# Patient Record
Sex: FEMALE | Race: WHITE | NOT HISPANIC OR LATINO | Employment: OTHER | URBAN - METROPOLITAN AREA
[De-identification: names, ages, dates, MRNs, and addresses within clinical notes are randomized per-mention and may not be internally consistent; named-entity substitution may affect disease eponyms.]

---

## 2017-03-15 ENCOUNTER — GENERIC CONVERSION - ENCOUNTER (OUTPATIENT)
Dept: OTHER | Facility: OTHER | Age: 67
End: 2017-03-15

## 2017-03-22 ENCOUNTER — LAB CONVERSION - ENCOUNTER (OUTPATIENT)
Dept: OTHER | Facility: OTHER | Age: 67
End: 2017-03-22

## 2017-03-22 LAB — FERRITIN SERPL-MCNC: 43 NG/ML (ref 20–288)

## 2017-05-02 ENCOUNTER — TRANSCRIBE ORDERS (OUTPATIENT)
Dept: ADMINISTRATIVE | Facility: HOSPITAL | Age: 67
End: 2017-05-02

## 2017-05-02 DIAGNOSIS — Z12.31 ENCOUNTER FOR SCREENING MAMMOGRAM FOR MALIGNANT NEOPLASM OF BREAST: Primary | ICD-10-CM

## 2017-06-07 ENCOUNTER — HOSPITAL ENCOUNTER (OUTPATIENT)
Dept: BONE DENSITY | Facility: IMAGING CENTER | Age: 67
Discharge: HOME | End: 2017-06-07
Payer: MEDICARE

## 2017-06-07 DIAGNOSIS — Z12.31 ENCOUNTER FOR SCREENING MAMMOGRAM FOR MALIGNANT NEOPLASM OF BREAST: ICD-10-CM

## 2017-06-07 PROCEDURE — G0202 SCR MAMMO BI INCL CAD: HCPCS

## 2017-08-11 ENCOUNTER — LAB CONVERSION - ENCOUNTER (OUTPATIENT)
Dept: OTHER | Facility: OTHER | Age: 67
End: 2017-08-11

## 2017-08-11 LAB
A/G RATIO (HISTORICAL): 2.1 (CALC) (ref 1–2.5)
ALBUMIN SERPL BCP-MCNC: 4.2 G/DL (ref 3.6–5.1)
ALP SERPL-CCNC: 84 U/L (ref 33–130)
ALT SERPL W P-5'-P-CCNC: 11 U/L (ref 6–29)
AST SERPL W P-5'-P-CCNC: 14 U/L (ref 10–35)
BASOPHILS # BLD AUTO: 1.2 %
BASOPHILS # BLD AUTO: 49 CELLS/UL (ref 0–200)
BILIRUB SERPL-MCNC: 0.7 MG/DL (ref 0.2–1.2)
BUN SERPL-MCNC: 14 MG/DL (ref 7–25)
BUN/CREA RATIO (HISTORICAL): 13 (CALC) (ref 6–22)
CALCIUM SERPL-MCNC: 9.4 MG/DL (ref 8.6–10.4)
CHLORIDE SERPL-SCNC: 104 MMOL/L (ref 98–110)
CO2 SERPL-SCNC: 29 MMOL/L (ref 20–31)
CREAT SERPL-MCNC: 1.04 MG/DL (ref 0.5–0.99)
DEPRECATED RDW RBC AUTO: 12.6 % (ref 11–15)
EGFR AFRICAN AMERICAN (HISTORICAL): 63 ML/MIN/1.73M2
EGFR-AMERICAN CALC (HISTORICAL): 55 ML/MIN/1.73M2
EOSINOPHIL # BLD AUTO: 234 CELLS/UL (ref 15–500)
EOSINOPHIL # BLD AUTO: 5.7 %
FERRITIN SERPL-MCNC: 57 NG/ML (ref 20–288)
GAMMA GLOBULIN (HISTORICAL): 2 G/DL (CALC) (ref 1.9–3.7)
GLUCOSE (HISTORICAL): 93 MG/DL (ref 65–99)
HCT VFR BLD AUTO: 39.5 % (ref 35–45)
HGB BLD-MCNC: 13 G/DL (ref 11.7–15.5)
LYMPHOCYTES # BLD AUTO: 1419 CELLS/UL (ref 850–3900)
LYMPHOCYTES # BLD AUTO: 34.6 %
MCH RBC QN AUTO: 31.6 PG (ref 27–33)
MCHC RBC AUTO-ENTMCNC: 32.9 G/DL (ref 32–36)
MCV RBC AUTO: 95.9 FL (ref 80–100)
MONOCYTES # BLD AUTO: 414 CELLS/UL (ref 200–950)
MONOCYTES (HISTORICAL): 10.1 %
NEUTROPHILS # BLD AUTO: 1984 CELLS/UL (ref 1500–7800)
NEUTROPHILS # BLD AUTO: 48.4 %
PLATELET # BLD AUTO: 210 THOUSAND/UL (ref 140–400)
PMV BLD AUTO: 10.3 FL (ref 7.5–12.5)
POTASSIUM SERPL-SCNC: 4.2 MMOL/L (ref 3.5–5.3)
RBC # BLD AUTO: 4.12 MILLION/UL (ref 3.8–5.1)
SODIUM SERPL-SCNC: 140 MMOL/L (ref 135–146)
TOTAL PROTEIN (HISTORICAL): 6.2 G/DL (ref 6.1–8.1)
WBC # BLD AUTO: 4.1 THOUSAND/UL (ref 3.8–10.8)

## 2017-08-17 ENCOUNTER — ALLSCRIPTS OFFICE VISIT (OUTPATIENT)
Dept: OTHER | Facility: OTHER | Age: 67
End: 2017-08-17

## 2017-09-15 DIAGNOSIS — E83.119 HEMOCHROMATOSIS: ICD-10-CM

## 2018-01-12 VITALS
TEMPERATURE: 97.9 F | HEIGHT: 67 IN | SYSTOLIC BLOOD PRESSURE: 138 MMHG | BODY MASS INDEX: 27.86 KG/M2 | WEIGHT: 177.5 LBS | OXYGEN SATURATION: 97 % | DIASTOLIC BLOOD PRESSURE: 84 MMHG | RESPIRATION RATE: 15 BRPM | HEART RATE: 53 BPM

## 2018-01-14 NOTE — PROGRESS NOTES
Assessment    1  Hemochromatosis (275 03) (E83 119)    Plan  Hemochromatosis    · Drink plenty of fluids ; Status:Complete;   Done: [id number]   Ordered;  For:Hemochromatosis; Ordered By:Gutierrez Zazueta;   · (1) CBC/PLT/DIFF; Status:Active - Retrospective By Protocol Authorization; Requested  for:[id number];    Perform:[facility]; QIZ:[id number]; Last Updated By:Leonardo Chua; 8/18/2016 8:50:07 AM;Ordered;  For:Hemochromatosis; Ordered By:Gutierrez Zazueta;   · (1) CBC/PLT/DIFF; Status:Active; Requested for:11Aug2017;    Perform:[facility]; Due:11Aug2018; Ordered;  For:Hemochromatosis; Ordered By:Gutierrez Zazueta;   · (1) COMPREHENSIVE METABOLIC PANEL; Status:Active - Retrospective By Protocol  Authorization; Requested for:18Feb2017;    Perform:[facility]; ACW:[id number]; Last Updated By:Leonardo Chua; 8/18/2016 8:49:46 AM;Ordered;  For:Hemochromatosis; Ordered By:Gutierrez Zazueta;   · (1) COMPREHENSIVE METABOLIC PANEL; Status:Active; Requested for:11Aug2017;    Perform:[facility]; Due:11Aug2018; Ordered;  For:Hemochromatosis; Ordered By:Gutierrez Zazueta;   · Follow-up visit in 1 year Evaluation and Treatment  Follow-up  Status: Complete  Done:  [id number] 08:45AM   Ordered; For: Hemochromatosis; Ordered By: Chris Lopes Performed:  Due: [id number]; Last Updated By: Scott Ponce; 8/18/2016 8:47:56 AM    Discussion/Summary  Discussion Summary:   In summary, this is a 68-year-old female history of hemochromatosis as outlined  Recent ferritin is 34  CMP and CBC are normal  I note minor elevation in creatinine, probably related to increase muscle mass  This correlates temporarily with institution of an exercise program  Within the past year  She has been donating blood to the blood bank every 6 months  It would certainly be safe for her to do this more frequently as they had requested  Overall she's doing as well as could be expected  We'll see her back in one year for review   The patient voiced understanding above discussion  Understands and agrees with treatment plan: The treatment plan was reviewed with the patient/guardian  The patient/guardian understands and agrees with the treatment plan   Counseling Documentation With Imm: The patient was counseled regarding diagnostic results, instructions for management, patient and family education, impressions  total time of encounter was 15 minutes  History of Present Illness  HPI: Hereditary hemochromatosis diagnosed in 2007  Phlebotomy prn ferritin >50  Previous Therapy:   phlebotomy q 6 mos  Interval History: returned from [facility]  Review of Systems  Complete-Female:   Constitutional: No fever, no chills, feels well, no tiredness, no recent weight gain or weight loss  Eyes: No complaints of eye pain, no red eyes, no eyesight problems, no discharge, no dry eyes, no itching of eyes  ENT: no complaints of earache, no loss of hearing, no nose bleeds, no nasal discharge, no sore throat, no hoarseness  Cardiovascular: No complaints of slow heart rate, no fast heart rate, no chest pain, no palpitations, no leg claudication, no lower extremity edema  Respiratory: No complaints of shortness of breath, no wheezing, no cough, no SOB on exertion, no orthopnea, no PND  Gastrointestinal: No complaints of abdominal pain, no constipation, no nausea or vomiting, no diarrhea, no bloody stools  Genitourinary: No complaints of dysuria, no incontinence, no pelvic pain, no dysmenorrhea, no vaginal discharge or bleeding  Musculoskeletal: No complaints of arthralgias, no myalgias, no joint swelling or stiffness, no limb pain or swelling  Integumentary: No complaints of skin rash or lesions, no itching, no skin wounds, no breast pain or lump  Neurological: No complaints of headache, no confusion, no convulsions, no numbness, no dizziness or fainting, no tingling, no limb weakness, no difficulty walking     Psychiatric: Not suicidal, no sleep disturbance, no anxiety or depression, no change in personality, no emotional problems  Endocrine: No complaints of proptosis, no hot flashes, no muscle weakness, no deepening of the voice, no feelings of weakness  Hematologic/Lymphatic: No complaints of swollen glands, no swollen glands in the neck, does not bleed easily, does not bruise easily  Active Problems    1  Hemochromatosis (275 03) (E83 119)    Social History    · Never A Smoker    Current Meds   1  Allegra Allergy TABS; TAKE 1 TABLET TWICE DAILY AS NEEDED FOR ALLERGIES   Recorded   2  Aspirin 81 MG TABS; Take 1 tablet daily Recorded   3  Calcium 500 MG TABS Recorded   4  Carvedilol 12 5 MG Oral Tablet; Take 1 tablet twice daily Recorded   5  Diovan -12 5 MG Oral Tablet; Take 1 tablet once daily Recorded   6  Vitamin D 1000 UNIT Oral Tablet; Take as directed Recorded    Allergies    1  Biaxin TABS    Vitals  Vital Signs    Recorded: 22JQX1182 92:99PM   Systolic 764   Diastolic 84   Heart Rate 64   Respiration 14   Temperature 96 8 F   Pain Scale 0   O2 Saturation 98   Height 5 ft 7 in   Weight 180 lb    BMI Calculated 28 19   BSA Calculated 1 93     Physical Exam    Constitutional   General appearance: No acute distress, well appearing and well nourished  Eyes   Conjunctiva and lids: No swelling, erythema or discharge  Ears, Nose, Mouth, and Throat   External inspection of ears and nose: Normal     Oropharynx: Normal with no erythema, edema, exudate or lesions  Pulmonary   Auscultation of lungs: Clear to auscultation  Cardiovascular   Auscultation of heart: Normal rate and rhythm, normal S1 and S2, without murmurs  Examination of extremities for edema and/or varicosities: Normal     Abdomen   Abdomen: Non-tender, no masses  Liver and spleen: No hepatomegaly or splenomegaly  Lymphatic   Palpation of lymph nodes in neck: No lymphadenopathy      Musculoskeletal   Gait and station: Normal     Skin   Skin and subcutaneous tissue: Normal without rashes or lesions  Neurologic   Cranial nerves: Cranial nerves 2-12 intact      Psychiatric   Orientation to person, place, and time: Normal          Signatures   Electronically signed by : KAYLEY Chowdhury DOM D ,DO; Aug 18 2016  8:52AM EST                       (Author)

## 2018-01-17 NOTE — MISCELLANEOUS
Message   Recorded as Task   Date: 03/15/2017 10:55 AM, Created By: Richelle Wharton   Task Name: Call Back   Assigned To: Zeinab Helton   Regarding Patient: Jessica Mackenzie, Status: Active   Comment:    Adelaide Rushing - 15 Mar 2017 10:55 AM     TASK CREATED  Caller: Self; Results Inquiry; [phone] (Home)  Pt is calling for her Ferritin results  States that she has this drawn every 6 months and she had labs drawn on 2/24/17 but Ferritin wasn't listed on her script from Aug  2016  Please call pt back to clarify  Explained to patient that Ferritin was not drawn - will be mailing patient a slip for ferritin to be done Q 6 months      Active Problems    1  Hemochromatosis (275 03) (E83 119)    Current Meds   1  Allegra Allergy TABS (Fexofenadine HCl); TAKE 1 TABLET TWICE DAILY AS NEEDED   FOR ALLERGIES Recorded   2  Aspirin 81 MG TABS; Take 1 tablet daily Recorded   3  Calcium 500 MG TABS Recorded   4  Carvedilol 12 5 MG Oral Tablet; Take 1 tablet twice daily Recorded   5  Diovan -12 5 MG Oral Tablet (Valsartan-Hydrochlorothiazide); Take 1 tablet   once daily Recorded   6  Vitamin D 1000 UNIT Oral Tablet; Take as directed Recorded    Allergies    1  Biaxin TABS    Plan  Hemochromatosis    · (1) FERRITIN; Status:Active; Requested for:15Mar2017;    · (1) FERRITIN; Status: In Progress - Retrospective By Protocol Authorization,Order  Generated;  Requested for:Recurring Schedule: 3/15/2017; 9/15/2017 ;     Signatures   Electronically signed by : Darlyn Santo, ; Mar 15 2017 11:15AM EST                       (Author)

## 2018-02-17 DIAGNOSIS — E83.119 HEMOCHROMATOSIS: ICD-10-CM

## 2018-02-21 LAB
ERYTHROCYTE [DISTWIDTH] IN BLOOD BY AUTOMATED COUNT: 12.5 % (ref 11–15)
FERRITIN SERPL-MCNC: 40 NG/ML (ref 20–288)
HCT VFR BLD AUTO: 40.1 % (ref 35–45)
HGB BLD-MCNC: 13.6 G/DL (ref 11.7–15.5)
MCH RBC QN AUTO: 32.2 PG (ref 27–33)
MCHC RBC AUTO-ENTMCNC: 33.9 G/DL (ref 32–36)
MCV RBC AUTO: 95 FL (ref 80–100)
PLATELET # BLD AUTO: 245 THOUSAND/UL (ref 140–400)
PMV BLD REES-ECKER: 10.1 FL (ref 7.5–12.5)
RBC # BLD AUTO: 4.22 MILLION/UL (ref 3.8–5.1)
WBC # BLD AUTO: 5.5 THOUSAND/UL (ref 3.8–10.8)

## 2018-04-27 ENCOUNTER — TRANSCRIBE ORDERS (OUTPATIENT)
Dept: ADMINISTRATIVE | Facility: HOSPITAL | Age: 68
End: 2018-04-27

## 2018-04-27 DIAGNOSIS — Z12.31 VISIT FOR SCREENING MAMMOGRAM: Primary | ICD-10-CM

## 2018-06-11 ENCOUNTER — HOSPITAL ENCOUNTER (OUTPATIENT)
Dept: BONE DENSITY | Facility: IMAGING CENTER | Age: 68
Discharge: HOME | End: 2018-06-11
Payer: MEDICARE

## 2018-06-11 DIAGNOSIS — Z12.31 VISIT FOR SCREENING MAMMOGRAM: ICD-10-CM

## 2018-06-11 PROCEDURE — 77067 SCR MAMMO BI INCL CAD: CPT

## 2018-08-09 ENCOUNTER — TELEPHONE (OUTPATIENT)
Dept: HEMATOLOGY ONCOLOGY | Facility: CLINIC | Age: 68
End: 2018-08-09

## 2018-08-09 DIAGNOSIS — E83.119 HEMOCHROMATOSIS, UNSPECIFIED HEMOCHROMATOSIS TYPE: Primary | ICD-10-CM

## 2018-08-09 LAB
CREAT ?TM UR-SCNC: 61 UMOL/L
MICROALBUMIN UR-MCNC: 0.3 MG/L (ref 0–20)
MICROALBUMIN/CREAT UR: 5 MG/G{CREAT}

## 2018-08-10 LAB
BASOPHILS # BLD AUTO: 27 CELLS/UL (ref 0–200)
BASOPHILS NFR BLD AUTO: 0.4 %
EOSINOPHIL # BLD AUTO: 320 CELLS/UL (ref 15–500)
EOSINOPHIL NFR BLD AUTO: 4.7 %
ERYTHROCYTE [DISTWIDTH] IN BLOOD BY AUTOMATED COUNT: 12.1 % (ref 11–15)
FERRITIN SERPL-MCNC: 62 NG/ML (ref 20–288)
HCT VFR BLD AUTO: 37.1 % (ref 35–45)
HGB BLD-MCNC: 12.5 G/DL (ref 11.7–15.5)
LYMPHOCYTES # BLD AUTO: 1421 CELLS/UL (ref 850–3900)
LYMPHOCYTES NFR BLD AUTO: 20.9 %
MCH RBC QN AUTO: 33 PG (ref 27–33)
MCHC RBC AUTO-ENTMCNC: 33.7 G/DL (ref 32–36)
MCV RBC AUTO: 97.9 FL (ref 80–100)
MONOCYTES # BLD AUTO: 564 CELLS/UL (ref 200–950)
MONOCYTES NFR BLD AUTO: 8.3 %
NEUTROPHILS # BLD AUTO: 4468 CELLS/UL (ref 1500–7800)
NEUTROPHILS NFR BLD AUTO: 65.7 %
PLATELET # BLD AUTO: 263 THOUSAND/UL (ref 140–400)
PMV BLD REES-ECKER: 10 FL (ref 7.5–12.5)
RBC # BLD AUTO: 3.79 MILLION/UL (ref 3.8–5.1)
WBC # BLD AUTO: 6.8 THOUSAND/UL (ref 3.8–10.8)

## 2018-08-16 ENCOUNTER — OFFICE VISIT (OUTPATIENT)
Dept: HEMATOLOGY ONCOLOGY | Facility: CLINIC | Age: 68
End: 2018-08-16
Payer: MEDICARE

## 2018-08-16 VITALS
OXYGEN SATURATION: 98 % | HEART RATE: 48 BPM | DIASTOLIC BLOOD PRESSURE: 76 MMHG | SYSTOLIC BLOOD PRESSURE: 136 MMHG | RESPIRATION RATE: 17 BRPM | HEIGHT: 67 IN | TEMPERATURE: 97.9 F | BODY MASS INDEX: 27.81 KG/M2 | WEIGHT: 177.2 LBS

## 2018-08-16 DIAGNOSIS — E83.110 HEREDITARY HEMOCHROMATOSIS (HCC): Primary | ICD-10-CM

## 2018-08-16 PROCEDURE — 99213 OFFICE O/P EST LOW 20 MIN: CPT | Performed by: INTERNAL MEDICINE

## 2018-08-16 RX ORDER — KETOTIFEN FUMARATE 0.35 MG/ML
1 SOLUTION/ DROPS OPHTHALMIC AS NEEDED
COMMUNITY

## 2018-08-16 NOTE — PROGRESS NOTES
[facility] HEMATOLOGY ONCOLOGY Junious Manifold  [address]  [address] [address] [phone]    Lizzette Arrieta DHBQOS[birth year], [id number]  08/16/18    Discussion:   In summary, this is a 25-year-old female history of hemochromatosis  She has phlebotomy 2 or 3 times a year  Most recent ferritin was 62, essentially in her previously established range, 30-50  CBC and differential were normal   Continue phlebotomy is requested  She is up-to-date with colonoscopy and mammography  She has not had a GYN exam in many years  She had previously seen a GYN and was told that neuro further follow-up was requested  She had a hysterectomy more than 2 decades ago  Gyn exam was suggested at this time  Provider name and information were provided  She also had borderline elevation of creatinine last year  She was concerned about her kidney function  She will be contacting her PCP regarding more recent re-evaluation  I discussed the above with the patient  The patient  voiced understanding and agreement   ______________________________________________________________________    Chief Complaint   Patient presents with    Follow-up     Hemochromatosis       HPI:   No history exists  Interval History:  Clinically stable  0 - Asymptomatic    Review of Systems    Past Medical History:   Diagnosis Date    Cardiac disease     right bundle branch block    Hemochromatosis      There is no problem list on file for this patient        Current Outpatient Prescriptions:     aspirin 81 MG tablet, Take 81 mg by mouth daily, Disp: , Rfl:     CALCIUM PO, Take 1,000 mg by mouth daily, Disp: , Rfl:     carvedilol (COREG) 12 5 mg tablet, Take 12 5 mg by mouth 2 (two) times a day with meals, Disp: , Rfl:     Cholecalciferol (VITAMIN D PO), Take 2,000 Units by mouth daily  , Disp: , Rfl:     fexofenadine (ALLEGRA) 60 MG tablet, Take 60 mg by mouth every 12 (twelve) hours as needed, Disp: , Rfl:    ketotifen (ALAWAY) 0 025 % ophthalmic solution, 1 drop as needed, Disp: , Rfl:     valsartan-hydrochlorothiazide (DIOVAN-HCT) 320-12 5 MG per tablet, Take 1 tablet by mouth daily, Disp: , Rfl:   Allergies   Allergen Reactions    Biaxin [Clarithromycin]     Claritin [Loratadine]     Macrodantin [Nitrofurantoin Macrocrystal]      Past Surgical History:   Procedure Laterality Date    APPENDECTOMY      HYSTERECTOMY       Social History     Objective:  Vitals:    08/16/18 0840   BP: 136/76   BP Location: Left arm   Patient Position: Sitting   Pulse: (!) 48   Resp: 17   Temp: 97 9 °F (36 6 °C)   TempSrc: Tympanic   SpO2: 98%   Weight: 80 4 kg (177 lb 3 2 oz)   Height: 5' 7" (1 702 m)     Physical Exam      Labs: I personally reviewed the labs and imaging pertinent to this patient care

## 2018-08-16 NOTE — LETTER
August 16, 2018     Jeannie Mathias DO  [facility]  [address] [address]    Patient: Brendan Alva   YOB: 1950   Date of Visit: 8/16/2018       Dear Dr Derrell Frazier:    Thank you for referring Brendan Alva to me for evaluation  Below are my notes for this consultation  If you have questions, please do not hesitate to call me  I look forward to following your patient along with you  Sincerely,        Juana Henderson DO        CC: No Recipients  Juana Henderson DO  8/16/2018  9:31 AM  Sign at close encounter  [address]  [address]  [facility] [address] [facility] [address]  [phone]  [phone]    Kasie Rivera,1950, [id number]  08/16/18    Discussion:   In summary, this is a 64-year-old female history of hemochromatosis  She has phlebotomy 2 or 3 times a year  Most recent ferritin was 62, essentially in her previously established range, 30-50  CBC and differential were normal   Continue phlebotomy is requested  She is up-to-date with colonoscopy and mammography  She has not had a GYN exam in many years  She had previously seen a GYN and was told that neuro further follow-up was requested  She had a hysterectomy more than 2 decades ago  Gyn exam was suggested at this time  Provider name and information were provided  She also had borderline elevation of creatinine last year  She was concerned about her kidney function  She will be contacting her PCP regarding more recent re-evaluation  I discussed the above with the patient  The patient  voiced understanding and agreement   ______________________________________________________________________    Chief Complaint   Patient presents with    Follow-up     Hemochromatosis       HPI:   No history exists  Interval History:  Clinically stable    0 - Asymptomatic    Review of Systems    Past Medical History:   Diagnosis Date    Cardiac disease     right bundle branch block    Hemochromatosis      There is no problem list on file for this patient  Current Outpatient Prescriptions:     aspirin 81 MG tablet, Take 81 mg by mouth daily, Disp: , Rfl:     CALCIUM PO, Take 1,000 mg by mouth daily, Disp: , Rfl:     carvedilol (COREG) 12 5 mg tablet, Take 12 5 mg by mouth 2 (two) times a day with meals, Disp: , Rfl:     Cholecalciferol (VITAMIN D PO), Take 2,000 Units by mouth daily  , Disp: , Rfl:     fexofenadine (ALLEGRA) 60 MG tablet, Take 60 mg by mouth every 12 (twelve) hours as needed, Disp: , Rfl:     ketotifen (ALAWAY) 0 025 % ophthalmic solution, 1 drop as needed, Disp: , Rfl:     valsartan-hydrochlorothiazide (DIOVAN-HCT) 320-12 5 MG per tablet, Take 1 tablet by mouth daily, Disp: , Rfl:   Allergies   Allergen Reactions    Biaxin [Clarithromycin]     Claritin [Loratadine]     Macrodantin [Nitrofurantoin Macrocrystal]      Past Surgical History:   Procedure Laterality Date    APPENDECTOMY      HYSTERECTOMY       Social History     Objective:  Vitals:    08/16/18 0840   BP: 136/76   BP Location: Left arm   Patient Position: Sitting   Pulse: (!) 48   Resp: 17   Temp: 97 9 °F (36 6 °C)   TempSrc: Tympanic   SpO2: 98%   Weight: 80 4 kg (177 lb 3 2 oz)   Height: 5' 7" (1 702 m)     Physical Exam      Labs: I personally reviewed the labs and imaging pertinent to this patient care

## 2018-08-17 DIAGNOSIS — E83.119 HEMOCHROMATOSIS: ICD-10-CM

## 2018-10-08 ENCOUNTER — OFFICE VISIT (OUTPATIENT)
Dept: OBGYN CLINIC | Facility: MEDICAL CENTER | Age: 68
End: 2018-10-08
Payer: MEDICARE

## 2018-10-08 VITALS
WEIGHT: 178 LBS | HEIGHT: 66 IN | DIASTOLIC BLOOD PRESSURE: 70 MMHG | SYSTOLIC BLOOD PRESSURE: 124 MMHG | BODY MASS INDEX: 28.61 KG/M2

## 2018-10-08 DIAGNOSIS — Z01.419 ENCOUNTER FOR GYNECOLOGICAL EXAMINATION WITHOUT ABNORMAL FINDING: Primary | ICD-10-CM

## 2018-10-08 DIAGNOSIS — Z12.31 ENCOUNTER FOR SCREENING MAMMOGRAM FOR MALIGNANT NEOPLASM OF BREAST: ICD-10-CM

## 2018-10-08 PROCEDURE — G0101 CA SCREEN;PELVIC/BREAST EXAM: HCPCS | Performed by: OBSTETRICS & GYNECOLOGY

## 2018-10-08 RX ORDER — GLUCOSAMINE/D3/BOSWELLIA SERRA 1500MG-400
TABLET ORAL
COMMUNITY

## 2018-10-08 NOTE — PROGRESS NOTES
Assessment/Plan:    Pap smear not indicated, age/hysterectomy status  Discussed okay for gynecologic exam 2 years  Continue JOCELYNN measures  Call with concerns  Encounter for screening mammogram for malignant neoplasm of breast  -     Mammo screening bilateral w cad; Future            Subjective:      Patient ID: Philip Gardiner is a 77 y o  female  Yearly  Menarche-11  Grav-5 Para-2   x 2, Spont AB x 3  Hysterectomy   Pap- unknown  Mammogram 2018-negative  Dexa scan 2015- osteopenia  Colonoscopy-, next due     Arnoldo Benavides is doing well  Has not seen gynecologist in some time, was not clear that she had to come back after her hysterectomy  She underwent a vaginal hysterectomy in  for fibroids and abnormal bleeding  She still has her ovaries  She has had no bleeding, vaginal discharge, pelvic pain  She does have some JOCELYNN, she uses pose impreza and also a daily thin pad  She is well versed in this as 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 Gynecologic Exam         The following portions of the patient's history were reviewed and updated as appropriate: allergies, current medications, past family history, past medical history, past social history, past surgical history and problem list     Review of Systems      Objective:      /70 (BP Location: Left arm, Patient Position: Sitting, Cuff Size: Large)   Ht 5' 5 5" (1 664 m)   Wt 80 7 kg (178 lb)   BMI 29 17 kg/m²          Physical Exam   Constitutional: She is oriented to person, place, and time  She appears well-developed and well-nourished  No distress  Pulmonary/Chest: No respiratory distress  Right breast exhibits no inverted nipple, no mass, no nipple discharge, no skin change and no tenderness   Left breast exhibits no inverted nipple, no mass, no nipple discharge, no skin change and no tenderness  Abdominal: Soft  There is no tenderness  Genitourinary: Vagina normal  There is no rash or lesion on the right labia  There is no rash or lesion on the left labia  Right adnexum displays no mass and no tenderness  Left adnexum displays no mass and no tenderness  Genitourinary Comments: Cervix: surgically absent  Uterus: surgically absent     Neurological: She is alert and oriented to person, place, and time  Skin: Skin is warm and dry  Psychiatric: She has a normal mood and affect  Vitals reviewed

## 2019-02-23 LAB
BASOPHILS # BLD AUTO: 52 CELLS/UL (ref 0–200)
BASOPHILS NFR BLD AUTO: 1.2 %
EOSINOPHIL # BLD AUTO: 327 CELLS/UL (ref 15–500)
EOSINOPHIL NFR BLD AUTO: 7.6 %
ERYTHROCYTE [DISTWIDTH] IN BLOOD BY AUTOMATED COUNT: 11.8 % (ref 11–15)
HCT VFR BLD AUTO: 37.5 % (ref 35–45)
HGB BLD-MCNC: 12.6 G/DL (ref 11.7–15.5)
IRON SATN MFR SERPL: 44 % (CALC) (ref 11–50)
IRON SERPL-MCNC: 130 MCG/DL (ref 45–160)
LYMPHOCYTES # BLD AUTO: 1350 CELLS/UL (ref 850–3900)
LYMPHOCYTES NFR BLD AUTO: 31.4 %
MCH RBC QN AUTO: 32.4 PG (ref 27–33)
MCHC RBC AUTO-ENTMCNC: 33.6 G/DL (ref 32–36)
MCV RBC AUTO: 96.4 FL (ref 80–100)
MONOCYTES # BLD AUTO: 426 CELLS/UL (ref 200–950)
MONOCYTES NFR BLD AUTO: 9.9 %
NEUTROPHILS # BLD AUTO: 2146 CELLS/UL (ref 1500–7800)
NEUTROPHILS NFR BLD AUTO: 49.9 %
PLATELET # BLD AUTO: 240 THOUSAND/UL (ref 140–400)
PMV BLD REES-ECKER: 10.3 FL (ref 7.5–12.5)
RBC # BLD AUTO: 3.89 MILLION/UL (ref 3.8–5.1)
TIBC SERPL-MCNC: 294 MCG/DL (CALC) (ref 250–450)
WBC # BLD AUTO: 4.3 THOUSAND/UL (ref 3.8–10.8)

## 2019-06-18 ENCOUNTER — HOSPITAL ENCOUNTER (OUTPATIENT)
Dept: BONE DENSITY | Facility: IMAGING CENTER | Age: 69
Discharge: HOME | End: 2019-06-18
Payer: MEDICARE

## 2019-06-18 VITALS — HEIGHT: 67 IN | WEIGHT: 169 LBS | BODY MASS INDEX: 26.53 KG/M2

## 2019-06-18 DIAGNOSIS — Z12.31 ENCOUNTER FOR SCREENING MAMMOGRAM FOR MALIGNANT NEOPLASM OF BREAST: ICD-10-CM

## 2019-06-18 PROCEDURE — 77067 SCR MAMMO BI INCL CAD: CPT

## 2019-08-08 ENCOUNTER — TELEPHONE (OUTPATIENT)
Dept: HEMATOLOGY ONCOLOGY | Facility: CLINIC | Age: 69
End: 2019-08-08

## 2019-08-08 NOTE — TELEPHONE ENCOUNTER
Left message on cell phone and home voicemail  to call the office back to reschedule her appt with Dr Lyudmila Reed on 8/19

## 2019-08-09 ENCOUNTER — TELEPHONE (OUTPATIENT)
Dept: HEMATOLOGY ONCOLOGY | Facility: CLINIC | Age: 69
End: 2019-08-09

## 2019-08-09 NOTE — TELEPHONE ENCOUNTER
Left message to call office to reschedule appt with DR Zazueta  He will be out of the office that day

## 2019-08-12 ENCOUNTER — TELEPHONE (OUTPATIENT)
Dept: HEMATOLOGY ONCOLOGY | Facility: CLINIC | Age: 69
End: 2019-08-12

## 2019-08-12 DIAGNOSIS — E83.110 HEREDITARY HEMOCHROMATOSIS (HCC): Primary | ICD-10-CM

## 2019-08-12 NOTE — TELEPHONE ENCOUNTER
Patient called to schedule her 1 yr f/u appointment to see Dr Last Enter  Patient is scheduled for 8/22  Also she needs a new script for blood work  She would like to have it faxed over to [address] in [facility]  Ph: ; Fx: [phone]  Please call back and advise  Thank you

## 2019-08-21 LAB
BASOPHILS # BLD AUTO: 38 CELLS/UL (ref 0–200)
BASOPHILS NFR BLD AUTO: 0.8 %
EOSINOPHIL # BLD AUTO: 240 CELLS/UL (ref 15–500)
EOSINOPHIL NFR BLD AUTO: 5.1 %
ERYTHROCYTE [DISTWIDTH] IN BLOOD BY AUTOMATED COUNT: 12.5 % (ref 11–15)
HCT VFR BLD AUTO: 36.5 % (ref 35–45)
HGB BLD-MCNC: 12.2 G/DL (ref 11.7–15.5)
IRON SATN MFR SERPL: 35 % (CALC) (ref 16–45)
IRON SERPL-MCNC: 102 MCG/DL (ref 45–160)
LYMPHOCYTES # BLD AUTO: 1452 CELLS/UL (ref 850–3900)
LYMPHOCYTES NFR BLD AUTO: 30.9 %
MCH RBC QN AUTO: 33 PG (ref 27–33)
MCHC RBC AUTO-ENTMCNC: 33.4 G/DL (ref 32–36)
MCV RBC AUTO: 98.6 FL (ref 80–100)
MONOCYTES # BLD AUTO: 456 CELLS/UL (ref 200–950)
MONOCYTES NFR BLD AUTO: 9.7 %
NEUTROPHILS # BLD AUTO: 2515 CELLS/UL (ref 1500–7800)
NEUTROPHILS NFR BLD AUTO: 53.5 %
PLATELET # BLD AUTO: 204 THOUSAND/UL (ref 140–400)
PMV BLD REES-ECKER: 10.2 FL (ref 7.5–12.5)
RBC # BLD AUTO: 3.7 MILLION/UL (ref 3.8–5.1)
TIBC SERPL-MCNC: 292 MCG/DL (CALC) (ref 250–450)
WBC # BLD AUTO: 4.7 THOUSAND/UL (ref 3.8–10.8)

## 2019-08-22 ENCOUNTER — OFFICE VISIT (OUTPATIENT)
Dept: HEMATOLOGY ONCOLOGY | Facility: CLINIC | Age: 69
End: 2019-08-22
Payer: MEDICARE

## 2019-08-22 DIAGNOSIS — E83.110 HEREDITARY HEMOCHROMATOSIS (HCC): Primary | ICD-10-CM

## 2019-08-22 PROCEDURE — 99213 OFFICE O/P EST LOW 20 MIN: CPT | Performed by: INTERNAL MEDICINE

## 2019-08-22 NOTE — PROGRESS NOTES
[facility] HEMATOLOGY ONCOL[facility]Y [facility] [place]  [address] [facility] [address]  [phone]  [phone]    Destiny Monday Nicole,1950, [id number]  19    Discussion:   In summary, this is a 64-year-old female history of hemochromatosis  She continues with phlebotomy on a Q 4-6 month basis at the local blood bank  Most recent iron saturation was 35%  CBC normal   Clinically she functions without restriction  Continue treatment as outlined is recommended  I will see her back in 1 year for review  I discussed the above with the patient  The patient  voiced understanding and agreement   ______________________________________________________________________    Chief Complaint   Patient presents with    Follow-up       HPI:   No history exists  Interval History:  Clinically stable  ECOG-  0 - Asymptomatic    Review of Systems   Constitutional: Negative for appetite change, diaphoresis, fatigue and fever  HENT: Negative for sinus pain  Eyes: Negative for discharge  Respiratory: Negative for cough and shortness of breath  Cardiovascular: Negative for chest pain  Gastrointestinal: Negative for abdominal pain, constipation and diarrhea  Endocrine: Negative for cold intolerance  Genitourinary: Negative for difficulty urinating and hematuria  Musculoskeletal: Negative for joint swelling  Skin: Negative for rash  Allergic/Immunologic: Negative for environmental allergies  Neurological: Negative for dizziness and headaches  Hematological: Negative for adenopathy  Psychiatric/Behavioral: Negative for agitation         Past Medical History:   Diagnosis Date    Cardiac disease     right bundle branch block    Fibroids     Hemochromatosis     Right bundle branch block      (spontaneous vaginal delivery)     x2     Patient Active Problem List   Diagnosis    Hereditary hemochromatosis ([facility] [address] )       Current Outpatient Medications:     aspirin 81 MG tablet, Take 81 mg by mouth daily, Disp: , Rfl:     B Complex Vitamins (B COMPLEX PO), Take by mouth daily, Disp: , Rfl:     Biotin 60423 MCG TABS, Take by mouth, Disp: , Rfl:     CALCIUM PO, Take 1,000 mg by mouth daily, Disp: , Rfl:     carvedilol (COREG) 12 5 mg tablet, Take 12 5 mg by mouth 2 (two) times a day with meals, Disp: , Rfl:     Cholecalciferol (VITAMIN D PO), Take 2,000 Units by mouth daily  , Disp: , Rfl:     fexofenadine (ALLEGRA) 60 MG tablet, Take 60 mg by mouth every 12 (twelve) hours as needed, Disp: , Rfl:     ketotifen (ALAWAY) 0 025 % ophthalmic solution, 1 drop as needed, Disp: , Rfl:     valsartan-hydrochlorothiazide (DIOVAN-HCT) 320-12 5 MG per tablet, Take 1 tablet by mouth daily, Disp: , Rfl:   Allergies   Allergen Reactions    Biaxin [Clarithromycin]     Claritin [Loratadine]     Macrodantin [Nitrofurantoin Macrocrystal]      Past Surgical History:   Procedure Laterality Date    APPENDECTOMY      DILATION AND CURETTAGE OF UTERUS      HYSTERECTOMY      age 26 per [facility]     Social History     Objective: There were no vitals filed for this visit  Physical Exam   Constitutional: She is oriented to person, place, and time  She appears well-developed  HENT:   Head: Normocephalic  Eyes: Pupils are equal, round, and reactive to light  Neck: Neck supple  Cardiovascular: Normal rate  No murmur heard  Pulmonary/Chest: No respiratory distress  She has no wheezes  She has no rales  Abdominal: Soft  She exhibits no distension  There is no tenderness  There is no rebound  Musculoskeletal: She exhibits no edema  Lymphadenopathy:     She has no cervical adenopathy  Neurological: She is alert and oriented to person, place, and time  She displays normal reflexes  Skin: Skin is warm  No rash noted  Psychiatric: She has a normal mood and affect  Thought content normal          Labs: I personally reviewed the labs and imaging pertinent to this patient care

## 2020-02-17 LAB
IRON SATN MFR SERPL: 47 % (CALC) (ref 16–45)
IRON SERPL-MCNC: 144 MCG/DL (ref 45–160)
TIBC SERPL-MCNC: 308 MCG/DL (CALC) (ref 250–450)

## 2020-06-01 ENCOUNTER — TRANSCRIBE ORDERS (OUTPATIENT)
Dept: ADMINISTRATIVE | Facility: HOSPITAL | Age: 70
End: 2020-06-01

## 2020-06-01 DIAGNOSIS — M25.511 ACUTE PAIN OF RIGHT SHOULDER: Primary | ICD-10-CM

## 2020-06-01 DIAGNOSIS — Z12.31 SCREENING MAMMOGRAM FOR HIGH-RISK PATIENT: ICD-10-CM

## 2020-06-15 ENCOUNTER — APPOINTMENT (OUTPATIENT)
Dept: RADIOLOGY | Age: 70
End: 2020-06-15
Payer: MEDICARE

## 2020-06-15 DIAGNOSIS — M25.511 ACUTE PAIN OF RIGHT SHOULDER: ICD-10-CM

## 2020-06-15 PROCEDURE — 73030 X-RAY EXAM OF SHOULDER: CPT

## 2020-06-19 ENCOUNTER — HOSPITAL ENCOUNTER (OUTPATIENT)
Dept: BONE DENSITY | Facility: IMAGING CENTER | Age: 70
Discharge: HOME | End: 2020-06-19
Payer: MEDICARE

## 2020-06-19 VITALS — HEIGHT: 66 IN | BODY MASS INDEX: 27.16 KG/M2 | WEIGHT: 169 LBS

## 2020-06-19 DIAGNOSIS — Z12.31 SCREENING MAMMOGRAM FOR HIGH-RISK PATIENT: ICD-10-CM

## 2020-06-19 PROCEDURE — 77063 BREAST TOMOSYNTHESIS BI: CPT

## 2020-06-19 PROCEDURE — 77067 SCR MAMMO BI INCL CAD: CPT

## 2020-06-25 ENCOUNTER — EVALUATION (OUTPATIENT)
Dept: PHYSICAL THERAPY | Facility: REHABILITATION | Age: 70
End: 2020-06-25
Payer: MEDICARE

## 2020-06-25 ENCOUNTER — TRANSCRIBE ORDERS (OUTPATIENT)
Dept: PHYSICAL THERAPY | Facility: REHABILITATION | Age: 70
End: 2020-06-25

## 2020-06-25 VITALS — HEART RATE: 55 BPM | OXYGEN SATURATION: 99 %

## 2020-06-25 DIAGNOSIS — M25.511 ACUTE PAIN OF RIGHT SHOULDER: Primary | ICD-10-CM

## 2020-06-25 PROCEDURE — 97140 MANUAL THERAPY 1/> REGIONS: CPT | Performed by: PHYSICAL THERAPIST

## 2020-06-25 PROCEDURE — 97161 PT EVAL LOW COMPLEX 20 MIN: CPT | Performed by: PHYSICAL THERAPIST

## 2020-06-30 ENCOUNTER — OFFICE VISIT (OUTPATIENT)
Dept: PHYSICAL THERAPY | Facility: REHABILITATION | Age: 70
End: 2020-06-30
Payer: MEDICARE

## 2020-06-30 DIAGNOSIS — M25.511 ACUTE PAIN OF RIGHT SHOULDER: Primary | ICD-10-CM

## 2020-06-30 PROCEDURE — 97110 THERAPEUTIC EXERCISES: CPT | Performed by: PHYSICAL THERAPIST

## 2020-06-30 PROCEDURE — 97140 MANUAL THERAPY 1/> REGIONS: CPT | Performed by: PHYSICAL THERAPIST

## 2020-07-02 ENCOUNTER — OFFICE VISIT (OUTPATIENT)
Dept: PHYSICAL THERAPY | Facility: REHABILITATION | Age: 70
End: 2020-07-02
Payer: MEDICARE

## 2020-07-02 DIAGNOSIS — M25.511 ACUTE PAIN OF RIGHT SHOULDER: Primary | ICD-10-CM

## 2020-07-02 PROCEDURE — 97112 NEUROMUSCULAR REEDUCATION: CPT | Performed by: PHYSICAL THERAPIST

## 2020-07-02 PROCEDURE — 97140 MANUAL THERAPY 1/> REGIONS: CPT | Performed by: PHYSICAL THERAPIST

## 2020-07-02 PROCEDURE — 97110 THERAPEUTIC EXERCISES: CPT | Performed by: PHYSICAL THERAPIST

## 2020-07-02 NOTE — PROGRESS NOTES
Daily Note     Today's date: 2020  Patient name: Shivani Souza  : 1950  MRN: [id number]  Referring provider: Dao Rodriguez DO  Dx:   Encounter Diagnosis     ICD-10-CM    1  Acute pain of right shoulder M25 511        Start Time: 1530  Stop Time: 1615  Total time in clinic (min): 45 minutes    Subjective:  Pt feels her shoulder ROM is improving  Objective: See treatment diary below  HEP updated with scap recruitment  Discussed TE options in the swimming pool  Within quality of range improving with shoulder abduction and flexion  Shoulder er and ir prom = wnl        Assessment: Tolerated treatment well  Patient would benefit from continued PT      Plan: Continue per plan of care        Precautions: hemochromatosis      Manuals           Supine R shoulder PROM - gentle LMR LMR LMR          GH joint mobs - inf Gr 2 - LMR Gr 2 - LMR Gr 2 - LMR          GH joint mobs - post Gr 2 - LMR Gr 2 & 4 - LMR Gr 2 - LMR          S/l shoulder PROM   LMR          Neuro Re-Ed                                                                                                      Ther Ex           Standing - flexion - counter slides x2 hep           HEP LMR LMR LMR          ube  4' 5'          OH band stretch  Thick yellow - 2'           Banded lat stretch  Thick yellow - 2'           Seated scap recruitment   15          S/l shoulder flexion (to 90)   15, 9                        Ther Activity                                       Gait Training                                       Modalities

## 2020-07-07 ENCOUNTER — OFFICE VISIT (OUTPATIENT)
Dept: PHYSICAL THERAPY | Facility: REHABILITATION | Age: 70
End: 2020-07-07
Payer: MEDICARE

## 2020-07-07 DIAGNOSIS — M25.511 ACUTE PAIN OF RIGHT SHOULDER: Primary | ICD-10-CM

## 2020-07-07 PROCEDURE — 97140 MANUAL THERAPY 1/> REGIONS: CPT | Performed by: PHYSICAL THERAPIST

## 2020-07-07 PROCEDURE — 97110 THERAPEUTIC EXERCISES: CPT | Performed by: PHYSICAL THERAPIST

## 2020-07-07 NOTE — PROGRESS NOTES
Daily Note     Today's date: 2020  Patient name: Marianela Guadalupe  : 1950  MRN: [id number]  Referring provider: Diana Mcdonough DO  Dx:   Encounter Diagnosis     ICD-10-CM    1  Acute pain of right shoulder M25 511        Start Time: 730  Stop Time: 825  Total time in clinic (min): 55 minutes    Subjective:  Pt was sore for 1-2 days after the last session  She continues to c/o limitations with OH reaching due to pain  Objective: See treatment diary below  HEP updated with p-ball rolls on wall and prone scap retraction + shoulder extension  Pt guards at end-range flexion and abduction  Abduction PROM ~ 90 deg  Assessment: Tolerated treatment well  Patient would benefit from continued PT    Plan: Continue per plan of care  Focus on pain-free gentle PROM  Resume s/l shoulder flexion arom next visit and progress scap mechanics        Precautions: hemochromatosis      Manuals          Supine R shoulder PROM - gentle LMR LMR LMR LMR         GH joint mobs - inf Gr 2 - LMR Gr 2 - LMR Gr 2 - LMR Gr 2 - LMR         GH joint mobs - post Gr 2 - LMR Gr 2 & 4 - LMR Gr 2 - LMR Gr 2 & 4 - LMR         S/l shoulder PROM   LMR          Neuro Re-Ed                                                                                                     Ther Ex          Standing - flexion - counter slides x2 hep           HEP LMR LMR LMR LMR         ube  4' 5' 5'         OH band stretch  Thick yellow - 2'           Banded lat stretch  Thick yellow - 2'           Seated scap recruitment   15          S/l shoulder flexion (to 90)   15, 9           Prone scap retractions    3"x13         Prone scap retraction + shoulder extension    3x5         p-ball OH rolls    x7         p-ball bounce    3'                                   Ther Activity                                       Gait Training                                       Modalities

## 2020-07-13 ENCOUNTER — OFFICE VISIT (OUTPATIENT)
Dept: PHYSICAL THERAPY | Facility: REHABILITATION | Age: 70
End: 2020-07-13
Payer: MEDICARE

## 2020-07-13 DIAGNOSIS — M25.511 ACUTE PAIN OF RIGHT SHOULDER: Primary | ICD-10-CM

## 2020-07-13 PROCEDURE — 97112 NEUROMUSCULAR REEDUCATION: CPT | Performed by: PHYSICAL MEDICINE & REHABILITATION

## 2020-07-13 PROCEDURE — 97140 MANUAL THERAPY 1/> REGIONS: CPT | Performed by: PHYSICAL MEDICINE & REHABILITATION

## 2020-07-13 PROCEDURE — 97110 THERAPEUTIC EXERCISES: CPT | Performed by: PHYSICAL MEDICINE & REHABILITATION

## 2020-07-13 NOTE — PROGRESS NOTES
Daily Note     Today's date: 2020  Patient name: Taran Escobedo  : 1950  MRN: [id number]  Referring provider: Pedro Restrepo DO  Dx:   Encounter Diagnosis     ICD-10-CM    1  Acute pain of right shoulder M25 511                   Subjective:  Patient reports significant discomfort with performance of prone shoulder extension as part of HEP, left this exercise out over the weekend and had no further issues  Objective: See treatment diary below    Assessment: Tolerated treatment well  Able to resume s/l flexion without issue, minimal sxs today with prone extension after slight form adjustment  Patient would benefit from continued PT  Plan: Continue per plan of care  Focus on pain-free gentle PROM       Precautions: hemochromatosis    Manuals         Supine R shoulder PROM - gentle LMR LMR LMR LMR LH        GH joint mobs - inf Gr 2 - LMR Gr 2 - LMR Gr 2 - LMR Gr 2 - LMR LH        GH joint mobs - post Gr 2 - LMR Gr 2 & 4 - LMR Gr 2 - LMR Gr 2 & 4 - LMR LH        S/l shoulder PROM   LMR          Neuro Re-Ed                                                                                                    Ther Ex         Standing - flexion - counter slides x2 hep           HEP LMR LMR LMR LMR         ube  4' 5' 5' 3'/3'        OH band stretch  Thick yellow - 2'           Banded lat stretch  Thick yellow - 2'           Seated scap recruitment   15          S/l shoulder flexion (to 90)   15, 9   3x5        Prone scap retractions    3"x13 2x10x3"        Prone scap retraction + shoulder extension    3x5 3x5        p-ball OH rolls    x7 x10        p-ball bounce    3' 3'                                  Ther Activity                                       Gait Training                                       Modalities

## 2020-07-16 ENCOUNTER — OFFICE VISIT (OUTPATIENT)
Dept: PHYSICAL THERAPY | Facility: REHABILITATION | Age: 70
End: 2020-07-16
Payer: MEDICARE

## 2020-07-16 DIAGNOSIS — M25.511 ACUTE PAIN OF RIGHT SHOULDER: Primary | ICD-10-CM

## 2020-07-16 PROCEDURE — 97110 THERAPEUTIC EXERCISES: CPT | Performed by: PHYSICAL MEDICINE & REHABILITATION

## 2020-07-16 PROCEDURE — 97140 MANUAL THERAPY 1/> REGIONS: CPT | Performed by: PHYSICAL MEDICINE & REHABILITATION

## 2020-07-16 PROCEDURE — 97112 NEUROMUSCULAR REEDUCATION: CPT | Performed by: PHYSICAL MEDICINE & REHABILITATION

## 2020-07-16 NOTE — PROGRESS NOTES
Daily Note     Today's date: 2020  Patient name: Nohemi Lazaro  : 1950  MRN: [id number]  Referring provider: Yvan Lindquist DO  Dx:   Encounter Diagnosis     ICD-10-CM    1  Acute pain of right shoulder M25 511                   Subjective:  Patient notes soreness, no additional issues noted  Able to perform HEP without issue  Objective: See treatment diary below    Assessment: Tolerated treatment well  Good carryover with cueing for muscle activation during prone exercise  Limited passive range in flexion  Patient would benefit from continued PT  Plan: Continue per plan of care  Focus on pain-free gentle PROM       Precautions: hemochromatosis    Manuals        Supine R shoulder PROM - gentle LMR LMR LMR LMR LH LH       GH joint mobs - inf Gr 2 - LMR Gr 2 - LMR Gr 2 - LMR Gr 2 - LMR LH LH       GH joint mobs - post Gr 2 - LMR Gr 2 & 4 - LMR Gr 2 - LMR Gr 2 & 4 - LMR LH LH       S/l shoulder PROM   LMR          Neuro Re-Ed                                                                                                   Ther Ex        Standing - flexion - counter slides x2 hep           HEP LMR LMR LMR LMR         ube  4' 5' 5' 3'/3' 3'/3'       OH band stretch  Thick yellow - 2'           Banded lat stretch  Thick yellow - 2'           Seated scap recruitment   15          S/l shoulder flexion (to 90)   15, 9   3x5 2x10       Prone scap retractions    3"x13 2x10x3" 2x10x3"       Prone scap retraction + shoulder extension    3x5 3x5 3x5       p-ball OH rolls    x7 x10 10x       p-ball bounce    3' 3' 3'             Prone T, 3x5                    Ther Activity                                       Gait Training                                       Modalities

## 2020-07-20 ENCOUNTER — OFFICE VISIT (OUTPATIENT)
Dept: PHYSICAL THERAPY | Facility: REHABILITATION | Age: 70
End: 2020-07-20
Payer: MEDICARE

## 2020-07-20 DIAGNOSIS — M25.511 ACUTE PAIN OF RIGHT SHOULDER: Primary | ICD-10-CM

## 2020-07-20 PROCEDURE — 97112 NEUROMUSCULAR REEDUCATION: CPT | Performed by: PHYSICAL THERAPIST

## 2020-07-20 PROCEDURE — 97110 THERAPEUTIC EXERCISES: CPT | Performed by: PHYSICAL THERAPIST

## 2020-07-20 PROCEDURE — 97140 MANUAL THERAPY 1/> REGIONS: CPT | Performed by: PHYSICAL THERAPIST

## 2020-07-20 NOTE — PROGRESS NOTES
Daily Note     Today's date: 2020  Patient name: Eve Mena  : 1950  MRN: [id number]  Referring provider: Vangie Muller DO  Dx:   Encounter Diagnosis     ICD-10-CM    1  Acute pain of right shoulder M25 511        Start Time: 820  Stop Time: 0900  Total time in clinic (min): 40 minutes    Subjective: Pt c/o soreness yesterday, but states that dissipated after going in the pool  However, around 3 am she woke up with shoulder pain  Objective: See treatment diary below  HEP updated with ATYT's and b/l OH wall stretch  Pt advised to decrease the frequency of the HEP to only t i d  Supine R shoulder PROM:  Flexion ~ 100 deg, abduction = 90 deg, er = 90 deg with pain, ir = wfl      Assessment: Tolerated treatment well  Patient would benefit from continued PT    Plan: Continue per plan of care        Precautions: hemochromatosis    Manuals       Supine R shoulder PROM - gentle LMR LMR LMR LMR LH LH LMR      GH joint mobs - inf Gr 2 - LMR Gr 2 - LMR Gr 2 - LMR Gr 2 - LMR LH LH Gr 2 - LMR      GH joint mobs - post Gr 2 - LMR Gr 2 & 4 - LMR Gr 2 - LMR Gr 2 & 4 - LMR LH LH Gr 2 - LMR      S/l scap mobs       LMR                                                          S/l shoulder PROM   LMR          Neuro Re-Ed       Prone shoulder ext       0# - 15, 20      S/l shoulder horiz abd - scap focus       0# - 2x15      S/l shoulder flex - scap focus       0# - 2x5                                                          Ther Ex       Standing - flexion - counter slides x2 hep           HEP LMR LMR LMR LMR   LMR      ube  4' 5' 5' 3'/3' 3'/3' 3'/3'      OH band stretch  Thick yellow - 2'           Banded lat stretch  Thick yellow - 2'           Seated scap recruitment   15          S/l shoulder flexion (to 90)   15, 9   3x5 2x10       Prone scap retractions    3"x13 2x10x3" 2x10x3"       Prone scap retraction + shoulder extension    3x5 3x5 3x5       p-ball OH rolls    x7 x10 10x       p-ball bounce    3' 3' 3'             Prone T, 3x5       Foam roller - OH stretch       2x5      ATYT's       x5                                                          Ther Activity                                       Gait Training                                       Modalities

## 2020-07-23 ENCOUNTER — OFFICE VISIT (OUTPATIENT)
Dept: PHYSICAL THERAPY | Facility: REHABILITATION | Age: 70
End: 2020-07-23
Payer: MEDICARE

## 2020-07-23 DIAGNOSIS — M25.511 ACUTE PAIN OF RIGHT SHOULDER: Primary | ICD-10-CM

## 2020-07-23 PROCEDURE — 97110 THERAPEUTIC EXERCISES: CPT | Performed by: PHYSICAL THERAPIST

## 2020-07-23 PROCEDURE — 97140 MANUAL THERAPY 1/> REGIONS: CPT | Performed by: PHYSICAL THERAPIST

## 2020-07-23 NOTE — PROGRESS NOTES
Daily Note     Today's date: 2020  Patient name: Hung Craft  : 1950  MRN: [id number]  Referring provider: Rodolfo Browning DO  Dx:   Encounter Diagnosis     ICD-10-CM    1  Acute pain of right shoulder M25 511        Start Time: 815  Stop Time: 905  Total time in clinic (min): 50 minutes    Subjective: Pt feels she is slowly improving  Objective: See treatment diary below  Discussed frequency of HEP and which exercises to clump together  Sig improvements in flexion ROM and mild improvements in abduction PROM  Assessment: Tolerated treatment well  Patient exhibited good technique with therapeutic exercises      Plan: Continue per plan of care        Precautions: hemochromatosis    Manuals      Supine R shoulder PROM - gentle LMR LMR LMR LMR LH LH LMR LMR     GH joint mobs - inf Gr 2 - LMR Gr 2 - LMR Gr 2 - LMR Gr 2 - LMR LH LH Gr 2 - LMR Gr 2 - LMR     GH joint mobs - post Gr 2 - LMR Gr 2 & 4 - LMR Gr 2 - LMR Gr 2 & 4 - LMR LH LH Gr 2 - LMR Gr 2 - LMR     S/l scap mobs       LMR                                                          S/l shoulder PROM   LMR          Neuro Re-Ed      Prone shoulder ext       0# - 15, 20      S/l shoulder horiz abd - scap focus       0# - 2x15      S/l shoulder flex - scap focus       0# - 2x5                                                          Ther Ex      Standing - flexion - counter slides x2 hep           HEP LMR LMR LMR LMR   LMR LMR     ube  4' 5' 5' 3'/3' 3'/3' 3'/3' 5' B     OH band stretch  Thick yellow - 2'           Banded lat stretch  Thick yellow - 2'           Seated scap recruitment   15          S/l shoulder flexion (to 90)   15, 9   3x5 2x10  3xF     Prone scap retractions    3"x13 2x10x3" 2x10x3"       Prone scap retraction + shoulder extension    3x5 3x5 3x5       p-ball OH rolls    x7 x10 10x       p-ball bounce    3' 3' 3'             Prone T, 3x5       Foam roller - OH stretch       2x5 3x5     ATYT's       x5      [place] - shoulder er walkouts        3# - 2x5     [place] - shoulder ir walkouts        5# - 2x5                               Ther Activity                                       Gait Training                                       Modalities

## 2020-07-27 ENCOUNTER — OFFICE VISIT (OUTPATIENT)
Dept: PHYSICAL THERAPY | Facility: REHABILITATION | Age: 70
End: 2020-07-27
Payer: MEDICARE

## 2020-07-27 DIAGNOSIS — M25.511 ACUTE PAIN OF RIGHT SHOULDER: Primary | ICD-10-CM

## 2020-07-27 PROCEDURE — 97112 NEUROMUSCULAR REEDUCATION: CPT | Performed by: PHYSICAL THERAPIST

## 2020-07-27 PROCEDURE — 97140 MANUAL THERAPY 1/> REGIONS: CPT | Performed by: PHYSICAL THERAPIST

## 2020-07-27 PROCEDURE — 97110 THERAPEUTIC EXERCISES: CPT | Performed by: PHYSICAL THERAPIST
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
## 2020-07-31 ENCOUNTER — OFFICE VISIT (OUTPATIENT)
Dept: PHYSICAL THERAPY | Facility: REHABILITATION | Age: 70
End: 2020-07-31
Payer: MEDICARE

## 2020-07-31 ENCOUNTER — TRANSCRIBE ORDERS (OUTPATIENT)
Dept: PHYSICAL THERAPY | Facility: REHABILITATION | Age: 70
End: 2020-07-31

## 2020-07-31 DIAGNOSIS — M25.511 ACUTE PAIN OF RIGHT SHOULDER: Primary | ICD-10-CM

## 2020-07-31 PROCEDURE — 97110 THERAPEUTIC EXERCISES: CPT | Performed by: PHYSICAL THERAPIST

## 2020-07-31 PROCEDURE — 97112 NEUROMUSCULAR REEDUCATION: CPT | Performed by: PHYSICAL THERAPIST

## 2020-07-31 PROCEDURE — 97140 MANUAL THERAPY 1/> REGIONS: CPT | Performed by: PHYSICAL THERAPIST

## 2020-07-31 NOTE — LETTER
2020    DO Dl Childress [facility]  [address]    Patient: Lavon Marquis   YOB: 1950   Date of Visit: 2020     Encounter Diagnosis     ICD-10-CM    1  Acute pain of right shoulder M25 511        Dear Dr Bullock Mail:    Thank you for your recent referral of Lavon Marquis  Please review the attached evaluation summary from Tami's recent visit  Please verify that you agree with the plan of care by signing the attached order  If you have any questions or concerns, please do not hesitate to call  I sincerely appreciate the opportunity to share in the care of one of your patients and hope to have another opportunity to work with you in the near future  Sincerely,    Radha Watson PT      Referring Provider:      I certify that I have read the below Plan of Care and certify the need for these services furnished under this plan of treatment while under my care  Lakeisha Birch DO  [facility]  [address] [address]  VIA Facsimile: [phone]          Daily Note     Today's date: 2020  Patient name: Lavon Marquis  : 1950  MRN: [id number]  Referring provider: Kenton Stafford DO  Dx:   Encounter Diagnosis     ICD-10-CM    1  Acute pain of right shoulder M25 511        Start Time: 815  Stop Time: 08  Total time in clinic (min): 40 minutes    Subjective: Pt was able to wash her hair yesterday without pain or limitation  Also, she was able to sew for a few minutes without any residual sx's  Pain = 0/10 to 3/10  Objective: See treatment diary below  Orange band issued to add to the AT's at home  FOTO has increased from 62 to 70 indicating improvement  Pt has attended 10 sessions of PT from 20 to 20  Mod decreased scap upward rotation with OH elevation       Right Shoulder   Flexion: 145 degrees with pain  Abduction: 95 degrees with pain  External rotation 45°:  80 degrees with pain  Internal rotation 45°: 20 degrees       Right Shoulder MMT  Planes of Motion   Flexion: 5   Abduction: 5  External rotation at 0°:  5  Internal rotation at 0°: 5     Goals  ST - I with HEP - MET  2 - sleep > 2 hours without waking due to right shoulder pain - MET  LT - FOTO > 70 - MET  2 - vacuum with the right UE without getting tired - UNKNOWN  3 - sew > 1 hour without limitation - PARTIALLY MET  4 - sleep > 4 hours without waking due to right shoulder pain - MET  5 - hook bra without limitation - MET  6 - don/doff shirt using both arms without limitation - MET    Assessment: Tolerated treatment well  Patient exhibited good technique with therapeutic exercises      Plan: Continue per plan of care   PT one time weekly while transitioning to HEP     Precautions: hemochromatosis    Manuals    Supine R shoulder PROM - gentle LMR LMR LMR LMR LH LH LMR LMR LMR LMR   GH joint mobs - inf Gr 2 - LMR Gr 2 - LMR Gr 2 - LMR Gr 2 - LMR LH LH Gr 2 - LMR Gr 2 - LMR     GH joint mobs - post Gr 2 - LMR Gr 2 & 4 - LMR Gr 2 - LMR Gr 2 & 4 - LMR LH LH Gr 2 - LMR Gr 2 - LMR     S/l scap mobs       LMR      Tissue deformation - pectorals         LMR                 assess          LMR                S/l shoulder PROM   LMR          Neuro Re-Ed    Prone shoulder ext       0# - 15, 20      S/l shoulder horiz abd - scap focus       0# - 2x15      S/l shoulder flex - scap focus       0# - 2x5      XS - shoulder ext         3# - 25    Standing - OH reach - mechanics         X3 2x3   XS - AT's          3# - xF   [place] - row walkouts          10# - 2x5   Ther Ex    Standing - flexion - counter slides x2 hep           HEP LMR LMR LMR LMR   LMR LMR LMR LMR   ube  4' 5' 5' 3'/3' 3'/3' 3'/3' 5' B 5'B 5'B   OH band stretch  Thick yellow - 2'           Banded lat stretch  Thick yellow - 2'           Seated scap recruitment   15          S/l shoulder flexion (to 90)   15, 9   3x5 2x10  3xF hep    Prone scap retractions    3"x13 2x10x3" 2x10x3"       Prone scap retraction + shoulder extension    3x5 3x5 3x5       p-ball OH rolls    x7 x10 10x       p-ball bounce    3' 3' 3'             Prone T, 3x5       Foam roller - OH stretch       2x5 3x5 20"x5 hep   ATYT's       x5      [place] - shoulder er walkouts        3# - 2x5     [place] - shoulder ir walkouts        5# - 2x5     Db - bicep curls         5# - 2xF    S/l shoulder er          hep                                                                     Ther Activity                                       Gait Training                                       Modalities

## 2020-07-31 NOTE — PROGRESS NOTES
Daily Note     Today's date: 2020  Patient name: Darren Rendon  : 1950  MRN: [id number]  Referring provider: Sheng Day DO  Dx:   Encounter Diagnosis     ICD-10-CM    1  Acute pain of right shoulder M25 511        Start Time: 815  Stop Time: 855  Total time in clinic (min): 40 minutes    Subjective: Pt was able to wash her hair yesterday without pain or limitation  Also, she was able to sew for a few minutes without any residual sx's  Pain = 0/10 to 3/10  Objective: See treatment diary below  Orange band issued to add to the AT's at home  FOTO has increased from 62 to 70 indicating improvement  Pt has attended 10 sessions of PT from 20 to 20  Mod decreased scap upward rotation with OH elevation  Right Shoulder   Flexion: 145 degrees with pain  Abduction: 95 degrees with pain  External rotation 45°: 80 degrees with pain  Internal rotation 45°: 20 degrees       Right Shoulder MMT  Planes of Motion   Flexion: 5   Abduction: 5  External rotation at 0°: 5  Internal rotation at 0°: 5     Goals  ST - I with HEP - MET  2 - sleep > 2 hours without waking due to right shoulder pain - MET  LT - FOTO > 70 - MET  2 - vacuum with the right UE without getting tired - UNKNOWN  3 - sew > 1 hour without limitation - PARTIALLY MET  4 - sleep > 4 hours without waking due to right shoulder pain - MET  5 - hook bra without limitation - MET  6 - don/doff shirt using both arms without limitation - MET    Assessment: Tolerated treatment well  Patient exhibited good technique with therapeutic exercises      Plan: Continue per plan of care   PT one time weekly while transitioning to HEP     Precautions: hemochromatosis    Manuals    Supine R shoulder PROM - gentle LMR LMR LMR LMR LH LH LMR LMR LMR LMR   GH joint mobs - inf Gr 2 - LMR Gr 2 - LMR Gr 2 - LMR Gr 2 - LMR LH LH Gr 2 - LMR Gr 2 - LMR     GH joint mobs - post Gr 2 - LMR Gr 2 & 4 - LMR Gr 2 - LMR Gr 2 & 4 - LMR LH LH Gr 2 - LMR Gr 2 - LMR     S/l scap mobs       LMR      Tissue deformation - pectorals         LMR                 assess          LMR                S/l shoulder PROM   LMR          Neuro Re-Ed 06/25 06/30 07/02 07/07 7/13 7/16 07/20 07/23 07/27 07/31   Prone shoulder ext       0# - 15, 20      S/l shoulder horiz abd - scap focus       0# - 2x15      S/l shoulder flex - scap focus       0# - 2x5      XS - shoulder ext         3# - 25    Standing - OH reach - mechanics         X3 2x3   XS - AT's          3# - xF   [place] - row walkouts          10# - 2x5   Ther Ex 06/25 06/30 07/02 07/07 7/13 7/16 07/20 07/23 07/27 07/31   Standing - flexion - counter slides x2 hep           HEP LMR LMR LMR LMR   LMR LMR LMR LMR   ube  4' 5' 5' 3'/3' 3'/3' 3'/3' 5' B 5'B 5'B   OH band stretch  Thick yellow - 2'           Banded lat stretch  Thick yellow - 2'           Seated scap recruitment   15          S/l shoulder flexion (to 90)   15, 9   3x5 2x10  3xF hep    Prone scap retractions    3"x13 2x10x3" 2x10x3"       Prone scap retraction + shoulder extension    3x5 3x5 3x5       p-ball OH rolls    x7 x10 10x       p-ball bounce    3' 3' 3'             Prone T, 3x5       Foam roller - OH stretch       2x5 3x5 20"x5 hep   ATYT's       x5      [place] - shoulder er walkouts        3# - 2x5     [place] - shoulder ir walkouts        5# - 2x5     Db - bicep curls         5# - 2xF    S/l shoulder er          hep                                                                     Ther Activity                                       Gait Training                                       Modalities

## 2020-08-03 ENCOUNTER — OFFICE VISIT (OUTPATIENT)
Dept: PHYSICAL THERAPY | Facility: REHABILITATION | Age: 70
End: 2020-08-03
Payer: MEDICARE

## 2020-08-03 DIAGNOSIS — M25.511 ACUTE PAIN OF RIGHT SHOULDER: Primary | ICD-10-CM

## 2020-08-03 PROCEDURE — 97140 MANUAL THERAPY 1/> REGIONS: CPT | Performed by: PHYSICAL THERAPIST

## 2020-08-03 PROCEDURE — 97112 NEUROMUSCULAR REEDUCATION: CPT | Performed by: PHYSICAL THERAPIST

## 2020-08-03 PROCEDURE — 97110 THERAPEUTIC EXERCISES: CPT | Performed by: PHYSICAL THERAPIST

## 2020-08-03 NOTE — PROGRESS NOTES
Daily Note     Today's date: 8/3/2020  Patient name: Ze Arevalo  : 1950  MRN: [id number]  Referring provider: Olga Cullen DO  Dx:   Encounter Diagnosis     ICD-10-CM    1  Acute pain of right shoulder  M25 511        Start Time: 815  Stop Time: 855  Total time in clinic (min): 40 minutes    Subjective: Pt feels her ROM is improving  Objective: See treatment diary below  HEP updated with s/l shoulder sweeps and s/l open books  Assessment: Tolerated treatment well  Patient would benefit from continued PT    Plan: Continue per plan of care        Precautions: hemochromatosis    Manuals             Supine R shoulder PROM - gentle LMR            GH joint mobs - inf Gr 4 - LMR            GH joint mobs - post Gr 4 - LMR            S/l scap mobs LMR            Tissue deformation - pectorals                          assess                          S/l shoulder PROM             Neuro Re-Ed             Prone shoulder ext D/c            S/l shoulder horiz abd - scap focus 2x9            S/l shoulder flex - scap focus 2x9            XS - shoulder ext             XS - high row + ER 3# - 3x5            XS - AT's             [place] - er walkouts 5# - 2x5            Ther Ex             Standing - flexion - counter slides             HEP LMR            ube 5'                         XS - touchdowns 7# - 3x5            Seated scap recruitment             S/l shoulder flexion (to 90)             Prone scap retractions             Prone scap retraction + shoulder extension             p-ball OH rolls             p-ball bounce                          Foam roller - OH stretch             ATYT's                                       Db - bicep curls             S/l shoulder er  nv                                                                             Ther Activity                                       Gait Training                                       Modalities

## 2020-08-10 ENCOUNTER — OFFICE VISIT (OUTPATIENT)
Dept: PHYSICAL THERAPY | Facility: REHABILITATION | Age: 70
End: 2020-08-10
Payer: MEDICARE

## 2020-08-10 ENCOUNTER — TELEPHONE (OUTPATIENT)
Dept: HEMATOLOGY ONCOLOGY | Facility: MEDICAL CENTER | Age: 70
End: 2020-08-10

## 2020-08-10 DIAGNOSIS — M25.511 ACUTE PAIN OF RIGHT SHOULDER: Primary | ICD-10-CM

## 2020-08-10 PROCEDURE — 97110 THERAPEUTIC EXERCISES: CPT | Performed by: PHYSICAL THERAPIST

## 2020-08-10 PROCEDURE — 97140 MANUAL THERAPY 1/> REGIONS: CPT | Performed by: PHYSICAL THERAPIST

## 2020-08-10 NOTE — PROGRESS NOTES
Daily Note     Today's date: 8/10/2020  Patient name: Yuniel Madden  : 1950  MRN: [id number]  Referring provider: Gallito Plascencia DO  Dx:   Encounter Diagnosis     ICD-10-CM    1  Acute pain of right shoulder  M25 511        Start Time: 815  Stop Time: 55  Total time in clinic (min): 40 minutes    Subjective: Pt denies any limitations with basic ADL's, but does not lift multiple plates into an [address] [employer] kitchen cabinet  Objective: See treatment diary below  HEP updated with s/l shoulder er and standing shoulder sweeps     Assessment: Tolerated treatment well   Patient exhibited good technique with therapeutic exercises      Plan: f/u in 2 weeks     Precautions: hemochromatosis    Manuals 08/03 08/10           Supine R shoulder PROM - gentle LMR LMR           GH joint mobs - inf Gr 4 - LMR Gr 4 - LMR           GH joint mobs - post Gr 4 - LMR Gr 4 - LMR           S/l scap mobs LMR            Tissue deformation - pectorals                          assess                          S/l shoulder PROM             Neuro Re-Ed 08/03 08/10           Prone shoulder ext D/c            S/l shoulder horiz abd - scap focus 2x9            S/l shoulder flex - scap focus 2x9            XS - shoulder ext             XS - high row + ER 3# - 3x5            XS - AT's             [place] - er walkouts 5# - 2x5            Ther Ex 08/03 08/10           Standing - flexion - counter slides             HEP LMR LMR           ube 5' 6'                        XS - touchdowns 7# - 3x5            Seated scap recruitment             S/l shoulder flexion (to 90)  2x15           S/l shoulder er  3# - 4x5           Prone scap retraction + shoulder extension             S/l shoulder horiz abd arom  2x15           Standing shoulder sweeps  15                        Foam roller - OH stretch             ATYT's                                       Db - bicep curls             S/l shoulder er  nv Ther Activity                                       Gait Training                                       Modalities

## 2020-08-17 ENCOUNTER — APPOINTMENT (OUTPATIENT)
Dept: PHYSICAL THERAPY | Facility: REHABILITATION | Age: 70
End: 2020-08-17
Payer: MEDICARE

## 2020-08-18 ENCOUNTER — APPOINTMENT (OUTPATIENT)
Dept: LAB | Facility: CLINIC | Age: 70
End: 2020-08-18
Payer: MEDICARE

## 2020-08-18 DIAGNOSIS — E83.110 HEREDITARY HEMOCHROMATOSIS (HCC): ICD-10-CM

## 2020-08-18 LAB
ERYTHROCYTE [DISTWIDTH] IN BLOOD BY AUTOMATED COUNT: 12.7 % (ref 11.6–15.1)
FERRITIN SERPL-MCNC: 46 NG/ML (ref 8–388)
HCT VFR BLD AUTO: 40.9 % (ref 34.8–46.1)
HGB BLD-MCNC: 13.6 G/DL (ref 11.5–15.4)
IRON SATN MFR SERPL: 57 %
IRON SERPL-MCNC: 160 UG/DL (ref 50–170)
MCH RBC QN AUTO: 33.2 PG (ref 26.8–34.3)
MCHC RBC AUTO-ENTMCNC: 33.3 G/DL (ref 31.4–37.4)
MCV RBC AUTO: 100 FL (ref 82–98)
PLATELET # BLD AUTO: 226 THOUSANDS/UL (ref 149–390)
PMV BLD AUTO: 9.7 FL (ref 8.9–12.7)
RBC # BLD AUTO: 4.1 MILLION/UL (ref 3.81–5.12)
TIBC SERPL-MCNC: 280 UG/DL (ref 250–450)
WBC # BLD AUTO: 4.77 THOUSAND/UL (ref 4.31–10.16)

## 2020-08-18 PROCEDURE — 83550 IRON BINDING TEST: CPT

## 2020-08-18 PROCEDURE — 83540 ASSAY OF IRON: CPT

## 2020-08-18 PROCEDURE — 36415 COLL VENOUS BLD VENIPUNCTURE: CPT

## 2020-08-18 PROCEDURE — 85027 COMPLETE CBC AUTOMATED: CPT

## 2020-08-18 PROCEDURE — 82728 ASSAY OF FERRITIN: CPT

## 2020-08-24 ENCOUNTER — EVALUATION (OUTPATIENT)
Dept: PHYSICAL THERAPY | Facility: REHABILITATION | Age: 70
End: 2020-08-24
Payer: MEDICARE

## 2020-08-24 DIAGNOSIS — M25.511 ACUTE PAIN OF RIGHT SHOULDER: Primary | ICD-10-CM

## 2020-08-24 PROCEDURE — 97110 THERAPEUTIC EXERCISES: CPT | Performed by: PHYSICAL THERAPIST

## 2020-08-24 PROCEDURE — 97140 MANUAL THERAPY 1/> REGIONS: CPT | Performed by: PHYSICAL THERAPIST

## 2020-08-24 NOTE — PROGRESS NOTES
Daily Note     Today's date: 2020  Patient name: Adrien Garcia  : 1950  MRN: [id number]  Referring provider: Jackie Rose DO  Dx:   Encounter Diagnosis     ICD-10-CM    1  Acute pain of right shoulder  M25 511        Start Time: 810  Stop Time: 50  Total time in clinic (min): 40 minutes    Subjective: Pt is pleased with her progress  She was unloading the  this morning and was able to place 5 plates into the [facility] kitchen cabinet  Objective: See treatment diary below  Pt has attended 13 sessions of PT from 20 to 20  FOTO has increased from 62 to 74 indicating improvement  Supine R shoulder PROM:  Flexion = WFL, abduction ~ 100 deg, er = 95 deg  Seated R shoulder MMT:  Flexion = 5, abduction = 5, er = 4+, ir = 5  Reviewed and updated the HEP  (s/l open books, standing sundial sweeps, and OH foam rollouts)    Assessment: Tolerated treatment well   Patient exhibited good technique with therapeutic exercises      Plan: d/c to ongoing hep     Precautions: hemochromatosis    Manuals 08/03 08/10 08/24          Supine R shoulder PROM - gentle LMR LMR LMR          GH joint mobs - inf Gr 4 - LMR Gr 4 - LMR Gr 4 - LMR          GH joint mobs - post Gr 4 - LMR Gr 4 - LMR Gr 4 - LMR          S/l scap mobs LMR            Tissue deformation - pectorals                          assess   LMR                       S/l shoulder PROM             Neuro Re-Ed 08/03 08/10 08/24          Prone shoulder ext D/c            S/l shoulder horiz abd - scap focus 2x9            S/l shoulder flex - scap focus 2x9            XS - shoulder ext             XS - high row + ER 3# - 3x5            XS - AT's             [place] - er walkouts 5# - 2x5            Ther Ex 08/03 08/10 08/24          Standing - flexion - counter slides             HEP LMR LMR LMR          ube 5' 6' 5'                       XS - touchdowns 7# - 3x5            Seated scap recruitment             S/l shoulder flexion (to 90)  2x15 S/l shoulder er  3# - 4x5           Prone scap retraction + shoulder extension             S/l shoulder horiz abd arom  2x15           Standing shoulder sweeps  15 2x5                       Foam roller - OH stretch   2x5          ATYT's                                       Db - bicep curls             S/l shoulder er  nv            Open books   2x5                                                              Ther Activity                                       Gait Training                                       Modalities

## 2020-08-27 ENCOUNTER — OFFICE VISIT (OUTPATIENT)
Dept: HEMATOLOGY ONCOLOGY | Facility: CLINIC | Age: 70
End: 2020-08-27
Payer: MEDICARE

## 2020-08-27 VITALS
HEART RATE: 52 BPM | BODY MASS INDEX: 28.67 KG/M2 | OXYGEN SATURATION: 98 % | HEIGHT: 66 IN | RESPIRATION RATE: 16 BRPM | TEMPERATURE: 97.8 F | WEIGHT: 178.4 LBS | DIASTOLIC BLOOD PRESSURE: 72 MMHG | SYSTOLIC BLOOD PRESSURE: 122 MMHG

## 2020-08-27 DIAGNOSIS — E83.110 HEREDITARY HEMOCHROMATOSIS (HCC): Primary | ICD-10-CM

## 2020-08-27 PROCEDURE — 99213 OFFICE O/P EST LOW 20 MIN: CPT | Performed by: INTERNAL MEDICINE

## 2020-08-27 NOTE — PROGRESS NOTES
[facility] [place]  [address] [address] [phone]    Pepe Rivera,1950, [id number]  08/27/20    Discussion:   In summary, this is a 75-year-old female history hereditary hemochromatosis  Clinically she is stable  She had a fall in April 2020  She had some injuries to the right arm and leg  She has gone through physical therapy with recuperation  Recent iron saturation was 57%  She had phlebotomy thereafter  She skipped her phlebotomy in May, probably resulting in her current value  She will return to Q 2-3 month phlebotomies  Repeat blood work in 6 months  Follow-up visit in 1 year  She is up-to-date with colonoscopy, gyn, and mammography  I discussed the above with the patient  The patient  voiced understanding and agreement   ______________________________________________________________________    Chief Complaint   Patient presents with    Follow-up       HPI:  Oncology History    No history exists  Interval History:  Clinically stable  ECOG-  0 - Asymptomatic    Review of Systems   Constitutional: Negative for appetite change, diaphoresis, fatigue and fever  HENT: Negative for sinus pain  Eyes: Negative for discharge  Respiratory: Negative for cough and shortness of breath  Cardiovascular: Negative for chest pain  Gastrointestinal: Negative for abdominal pain, constipation and diarrhea  Endocrine: Negative for cold intolerance  Genitourinary: Negative for difficulty urinating and hematuria  Musculoskeletal: Negative for joint swelling  Skin: Negative for rash  Allergic/Immunologic: Negative for environmental allergies  Neurological: Negative for dizziness and headaches  Hematological: Negative for adenopathy  Psychiatric/Behavioral: Negative for agitation         Past Medical History:   Diagnosis Date    Cardiac disease     right bundle branch block    Fibroids     Hemochromatosis     Right bundle branch block      (spontaneous vaginal delivery)     x2     Patient Active Problem List   Diagnosis    Hereditary hemochromatosis ([facility] [address] )       Current Outpatient Medications:     B Complex Vitamins (B COMPLEX PO), Take by mouth daily, Disp: , Rfl:     Biotin 26566 MCG TABS, Take by mouth, Disp: , Rfl:     CALCIUM PO, Take 1,000 mg by mouth daily, Disp: , Rfl:     carvedilol (COREG) 12 5 mg tablet, Take 12 5 mg by mouth 2 (two) times a day with meals, Disp: , Rfl:     Cholecalciferol (VITAMIN D PO), Take 2,000 Units by mouth daily  , Disp: , Rfl:     fexofenadine (ALLEGRA) 60 MG tablet, Take 60 mg by mouth every 12 (twelve) hours as needed, Disp: , Rfl:     ketotifen (ALAWAY) 0 025 % ophthalmic solution, 1 drop as needed, Disp: , Rfl:     valsartan-hydrochlorothiazide (DIOVAN-HCT) 320-12 5 MG per tablet, Take 1 tablet by mouth daily, Disp: , Rfl:   Allergies   Allergen Reactions    Biaxin [Clarithromycin]     Claritin [Loratadine]     Macrodantin [Nitrofurantoin Macrocrystal]      Past Surgical History:   Procedure Laterality Date    APPENDECTOMY      DILATION AND CURETTAGE OF UTERUS      HYSTERECTOMY      age 26 per MRS     Social History     Objective:  Vitals:    20 0839   BP: 122/72   BP Location: Left arm   Patient Position: Sitting   Pulse: (!) 52   Resp: 16   Temp: 97 8 °F (36 6 °C)   TempSrc: Temporal   SpO2: 98%   Weight: 80 9 kg (178 lb 6 4 oz)   Height: 5' 6" (1 676 m)     Physical Exam  Constitutional:       Appearance: She is well-developed  HENT:      Head: Normocephalic and atraumatic  Eyes:      Pupils: Pupils are equal, round, and reactive to light  Neck:      Musculoskeletal: Neck supple  Cardiovascular:      Rate and Rhythm: Normal rate  Heart sounds: No murmur  Pulmonary:      Effort: No respiratory distress  Breath sounds: No wheezing or rales  Abdominal:      General: There is no distension        Palpations: Abdomen is soft  Tenderness: There is no abdominal tenderness  There is no rebound  Musculoskeletal:         General: No tenderness  Lymphadenopathy:      Cervical: No cervical adenopathy  Skin:     General: Skin is warm  Findings: No rash  Neurological:      Mental Status: She is alert and oriented to person, place, and time  Deep Tendon Reflexes: Reflexes normal    Psychiatric:         Thought Content: Thought content normal            Labs: I personally reviewed the labs and imaging pertinent to this patient care

## 2020-11-13 ENCOUNTER — ANNUAL EXAM (OUTPATIENT)
Dept: OBGYN CLINIC | Facility: MEDICAL CENTER | Age: 70
End: 2020-11-13
Payer: MEDICARE

## 2020-11-13 VITALS
BODY MASS INDEX: 28.8 KG/M2 | HEIGHT: 66 IN | WEIGHT: 179.2 LBS | TEMPERATURE: 97.3 F | SYSTOLIC BLOOD PRESSURE: 142 MMHG | DIASTOLIC BLOOD PRESSURE: 70 MMHG

## 2020-11-13 DIAGNOSIS — Z78.0 OSTEOPENIA AFTER MENOPAUSE: ICD-10-CM

## 2020-11-13 DIAGNOSIS — Z13.820 SCREENING FOR OSTEOPOROSIS: ICD-10-CM

## 2020-11-13 DIAGNOSIS — Z12.31 ENCOUNTER FOR SCREENING MAMMOGRAM FOR MALIGNANT NEOPLASM OF BREAST: ICD-10-CM

## 2020-11-13 DIAGNOSIS — M85.80 OSTEOPENIA AFTER MENOPAUSE: ICD-10-CM

## 2020-11-13 DIAGNOSIS — Z12.11 SCREENING FOR COLON CANCER: ICD-10-CM

## 2020-11-13 DIAGNOSIS — Z01.419 ENCOUNTER FOR GYNECOLOGICAL EXAMINATION WITHOUT ABNORMAL FINDING: Primary | ICD-10-CM

## 2020-11-13 DIAGNOSIS — Z78.0 POST-MENOPAUSAL: ICD-10-CM

## 2020-11-13 PROCEDURE — G0101 CA SCREEN;PELVIC/BREAST EXAM: HCPCS | Performed by: OBSTETRICS & GYNECOLOGY

## 2020-12-30 ENCOUNTER — LAB REQUISITION (OUTPATIENT)
Dept: LAB | Facility: HOSPITAL | Age: 70
End: 2020-12-30
Payer: MEDICARE

## 2020-12-30 DIAGNOSIS — Z86.010 PERSONAL HISTORY OF COLONIC POLYPS: ICD-10-CM

## 2020-12-30 DIAGNOSIS — K63.5 POLYP OF COLON: ICD-10-CM

## 2020-12-30 DIAGNOSIS — K57.30 DIVERTICULOSIS OF LARGE INTESTINE WITHOUT PERFORATION OR ABSCESS WITHOUT BLEEDING: ICD-10-CM

## 2020-12-30 PROCEDURE — 88305 TISSUE EXAM BY PATHOLOGIST: CPT | Performed by: PATHOLOGY

## 2021-02-24 LAB
BASOPHILS # BLD AUTO: 40 CELLS/UL (ref 0–200)
BASOPHILS NFR BLD AUTO: 1 %
EOSINOPHIL # BLD AUTO: 232 CELLS/UL (ref 15–500)
EOSINOPHIL NFR BLD AUTO: 5.8 %
ERYTHROCYTE [DISTWIDTH] IN BLOOD BY AUTOMATED COUNT: 12.4 % (ref 11–15)
HCT VFR BLD AUTO: 36.1 % (ref 35–45)
HGB BLD-MCNC: 12 G/DL (ref 11.7–15.5)
IRON SATN MFR SERPL: 30 % (CALC) (ref 16–45)
IRON SERPL-MCNC: 95 MCG/DL (ref 45–160)
LYMPHOCYTES # BLD AUTO: 1312 CELLS/UL (ref 850–3900)
LYMPHOCYTES NFR BLD AUTO: 32.8 %
MCH RBC QN AUTO: 31.8 PG (ref 27–33)
MCHC RBC AUTO-ENTMCNC: 33.2 G/DL (ref 32–36)
MCV RBC AUTO: 95.8 FL (ref 80–100)
MONOCYTES # BLD AUTO: 456 CELLS/UL (ref 200–950)
MONOCYTES NFR BLD AUTO: 11.4 %
NEUTROPHILS # BLD AUTO: 1960 CELLS/UL (ref 1500–7800)
NEUTROPHILS NFR BLD AUTO: 49 %
PLATELET # BLD AUTO: 231 THOUSAND/UL (ref 140–400)
PMV BLD REES-ECKER: 10.7 FL (ref 7.5–12.5)
RBC # BLD AUTO: 3.77 MILLION/UL (ref 3.8–5.1)
TIBC SERPL-MCNC: 314 MCG/DL (CALC) (ref 250–450)
WBC # BLD AUTO: 4 THOUSAND/UL (ref 3.8–10.8)

## 2021-03-02 DIAGNOSIS — Z23 ENCOUNTER FOR IMMUNIZATION: ICD-10-CM

## 2021-03-05 ENCOUNTER — IMMUNIZATIONS (OUTPATIENT)
Dept: FAMILY MEDICINE CLINIC | Facility: HOSPITAL | Age: 71
End: 2021-03-05

## 2021-03-05 DIAGNOSIS — Z23 ENCOUNTER FOR IMMUNIZATION: Primary | ICD-10-CM

## 2021-03-05 PROCEDURE — 0001A SARS-COV-2 / COVID-19 MRNA VACCINE (PFIZER-BIONTECH) 30 MCG: CPT

## 2021-03-05 PROCEDURE — 91300 SARS-COV-2 / COVID-19 MRNA VACCINE (PFIZER-BIONTECH) 30 MCG: CPT

## 2021-03-25 ENCOUNTER — IMMUNIZATIONS (OUTPATIENT)
Dept: FAMILY MEDICINE CLINIC | Facility: HOSPITAL | Age: 71
End: 2021-03-25

## 2021-03-25 DIAGNOSIS — Z23 ENCOUNTER FOR IMMUNIZATION: Primary | ICD-10-CM

## 2021-03-25 PROCEDURE — 0002A SARS-COV-2 / COVID-19 MRNA VACCINE (PFIZER-BIONTECH) 30 MCG: CPT

## 2021-03-25 PROCEDURE — 91300 SARS-COV-2 / COVID-19 MRNA VACCINE (PFIZER-BIONTECH) 30 MCG: CPT

## 2021-05-07 ENCOUNTER — TELEPHONE (OUTPATIENT)
Dept: HEMATOLOGY ONCOLOGY | Facility: CLINIC | Age: 71
End: 2021-05-07

## 2021-05-07 NOTE — TELEPHONE ENCOUNTER
I called the patient and left her a message to inform her that Dr Alise Escobar will be out of the office on 8/26  I rescheduled the patient for 8/30 @ 3:20 pm at the [facility]  I then stated if for some reason the appointment does not work to call the office back to reschedule

## 2021-07-07 ENCOUNTER — HOSPITAL ENCOUNTER (OUTPATIENT)
Dept: MAMMOGRAPHY | Facility: IMAGING CENTER | Age: 71
Discharge: HOME | End: 2021-07-07
Payer: MEDICARE

## 2021-07-07 ENCOUNTER — HOSPITAL ENCOUNTER (OUTPATIENT)
Dept: BONE DENSITY | Facility: IMAGING CENTER | Age: 71
Discharge: HOME | End: 2021-07-07
Payer: MEDICARE

## 2021-07-07 VITALS — HEIGHT: 66 IN | WEIGHT: 171 LBS | BODY MASS INDEX: 27.48 KG/M2

## 2021-07-07 DIAGNOSIS — M85.80 OSTEOPENIA AFTER MENOPAUSE: ICD-10-CM

## 2021-07-07 DIAGNOSIS — Z78.0 OSTEOPENIA AFTER MENOPAUSE: ICD-10-CM

## 2021-07-07 DIAGNOSIS — Z13.820 SCREENING FOR OSTEOPOROSIS: ICD-10-CM

## 2021-07-07 DIAGNOSIS — Z78.0 POST-MENOPAUSAL: ICD-10-CM

## 2021-07-07 DIAGNOSIS — Z12.31 ENCOUNTER FOR SCREENING MAMMOGRAM FOR MALIGNANT NEOPLASM OF BREAST: ICD-10-CM

## 2021-07-07 PROCEDURE — 77067 SCR MAMMO BI INCL CAD: CPT

## 2021-07-07 PROCEDURE — 77080 DXA BONE DENSITY AXIAL: CPT

## 2021-07-07 PROCEDURE — 77063 BREAST TOMOSYNTHESIS BI: CPT

## 2021-07-16 ENCOUNTER — TELEPHONE (OUTPATIENT)
Dept: OBGYN CLINIC | Facility: MEDICAL CENTER | Age: 71
End: 2021-07-16

## 2021-07-16 NOTE — TELEPHONE ENCOUNTER
Called pt, let her know of results and recommendations   ----- Message from Fela Jackson MD sent at 7/16/2021  9:57 AM EDT -----  DEXA reviewed - slight worsening of osteopenia of her hip, she should discuss this with her PCP at her next visit to consider if she needs treatment     Thanks

## 2021-08-24 ENCOUNTER — APPOINTMENT (OUTPATIENT)
Dept: LAB | Facility: CLINIC | Age: 71
End: 2021-08-24
Payer: MEDICARE

## 2021-08-24 DIAGNOSIS — E83.110 HEREDITARY HEMOCHROMATOSIS (HCC): ICD-10-CM

## 2021-08-24 LAB
ALBUMIN SERPL BCP-MCNC: 3.8 G/DL (ref 3.5–5)
ALP SERPL-CCNC: 95 U/L (ref 46–116)
ALT SERPL W P-5'-P-CCNC: 19 U/L (ref 12–78)
ANION GAP SERPL CALCULATED.3IONS-SCNC: 8 MMOL/L (ref 4–13)
AST SERPL W P-5'-P-CCNC: 15 U/L (ref 5–45)
BILIRUB SERPL-MCNC: 0.37 MG/DL (ref 0.2–1)
BUN SERPL-MCNC: 20 MG/DL (ref 5–25)
CALCIUM SERPL-MCNC: 9 MG/DL (ref 8.3–10.1)
CHLORIDE SERPL-SCNC: 107 MMOL/L (ref 100–108)
CO2 SERPL-SCNC: 28 MMOL/L (ref 21–32)
CREAT SERPL-MCNC: 1.37 MG/DL (ref 0.6–1.3)
ERYTHROCYTE [DISTWIDTH] IN BLOOD BY AUTOMATED COUNT: 13 % (ref 11.6–15.1)
FERRITIN SERPL-MCNC: 17 NG/ML (ref 8–388)
GFR SERPL CREATININE-BSD FRML MDRD: 38 ML/MIN/1.73SQ M
GLUCOSE P FAST SERPL-MCNC: 94 MG/DL (ref 65–99)
HCT VFR BLD AUTO: 37.8 % (ref 34.8–46.1)
HGB BLD-MCNC: 12.3 G/DL (ref 11.5–15.4)
IRON SATN MFR SERPL: 24 %
IRON SERPL-MCNC: 70 UG/DL (ref 50–170)
MCH RBC QN AUTO: 32.9 PG (ref 26.8–34.3)
MCHC RBC AUTO-ENTMCNC: 32.5 G/DL (ref 31.4–37.4)
MCV RBC AUTO: 101 FL (ref 82–98)
PLATELET # BLD AUTO: 202 THOUSANDS/UL (ref 149–390)
PMV BLD AUTO: 9.4 FL (ref 8.9–12.7)
POTASSIUM SERPL-SCNC: 4.1 MMOL/L (ref 3.5–5.3)
PROT SERPL-MCNC: 6.7 G/DL (ref 6.4–8.2)
RBC # BLD AUTO: 3.74 MILLION/UL (ref 3.81–5.12)
SODIUM SERPL-SCNC: 143 MMOL/L (ref 136–145)
TIBC SERPL-MCNC: 288 UG/DL (ref 250–450)
WBC # BLD AUTO: 4.49 THOUSAND/UL (ref 4.31–10.16)

## 2021-08-24 PROCEDURE — 36415 COLL VENOUS BLD VENIPUNCTURE: CPT

## 2021-08-24 PROCEDURE — 80053 COMPREHEN METABOLIC PANEL: CPT

## 2021-08-24 PROCEDURE — 82728 ASSAY OF FERRITIN: CPT

## 2021-08-24 PROCEDURE — 83540 ASSAY OF IRON: CPT

## 2021-08-24 PROCEDURE — 83550 IRON BINDING TEST: CPT

## 2021-08-24 PROCEDURE — 85027 COMPLETE CBC AUTOMATED: CPT

## 2021-08-30 ENCOUNTER — OFFICE VISIT (OUTPATIENT)
Dept: HEMATOLOGY ONCOLOGY | Facility: CLINIC | Age: 71
End: 2021-08-30
Payer: MEDICARE

## 2021-08-30 VITALS
HEART RATE: 52 BPM | BODY MASS INDEX: 27.59 KG/M2 | HEIGHT: 67 IN | SYSTOLIC BLOOD PRESSURE: 130 MMHG | DIASTOLIC BLOOD PRESSURE: 74 MMHG | OXYGEN SATURATION: 97 % | RESPIRATION RATE: 16 BRPM | TEMPERATURE: 97.1 F | WEIGHT: 175.8 LBS

## 2021-08-30 DIAGNOSIS — E83.110 HEREDITARY HEMOCHROMATOSIS (HCC): Primary | ICD-10-CM

## 2021-08-30 DIAGNOSIS — M85.852 OSTEOPENIA OF BOTH HIPS: ICD-10-CM

## 2021-08-30 DIAGNOSIS — M85.851 OSTEOPENIA OF BOTH HIPS: ICD-10-CM

## 2021-08-30 DIAGNOSIS — R79.89 ELEVATED SERUM CREATININE: ICD-10-CM

## 2021-08-30 PROCEDURE — 99214 OFFICE O/P EST MOD 30 MIN: CPT | Performed by: INTERNAL MEDICINE

## 2021-08-30 NOTE — PROGRESS NOTES
[facility]  [address]  [phone]  [phone]    Kira Rivera,1950, [id number]  08/30/21    Discussion:   In summary, this is a 22-year-old female history of hereditary hemochromatosis  She has phlebotomy 3-4 times per year via blood donation  Recent CBC is normal   Iron saturation 24%, ferritin 17  CMP shows creatinine 1 37, previously at 1 07  4years ago  I do not have intercurrent values  We reviewed this finding  Significance is unclear  We will recheck a creatinine in the near future  If it has returned to baseline, no further investigation  If stable further follow-up would be under her PCP  She is up-to-date with mammography  DEXA scan shows osteopenia though diminished bone density compared to a prior study of 6 years earlier  She takes calcium supplement, vitamin-D and carries out regular physical exercise  Observation continues on her PCP  I discussed the above with the patient  The patient  voiced understanding and agreement   ______________________________________________________________________    Chief Complaint   Patient presents with    Follow-up       HPI:  Oncology History    No history exists  Interval History:  Clinically stable  ECOG-  0 - Asymptomatic    Review of Systems   Constitutional: Negative for appetite change, diaphoresis, fatigue and fever  HENT: Negative for sinus pain  Eyes: Negative for discharge  Respiratory: Negative for cough and shortness of breath  Cardiovascular: Negative for chest pain  Gastrointestinal: Negative for abdominal pain, constipation and diarrhea  Endocrine: Negative for cold intolerance  Genitourinary: Negative for difficulty urinating and hematuria  Musculoskeletal: Negative for joint swelling  Skin: Negative for rash  Allergic/Immunologic: Negative for environmental allergies  Neurological: Negative for dizziness and headaches  Hematological: Negative for adenopathy  Psychiatric/Behavioral: Negative for agitation  Past Medical History:   Diagnosis Date    Cardiac disease     right bundle branch block    Fibroids     Hemochromatosis     Right bundle branch block      (spontaneous vaginal delivery)     x2     Patient Active Problem List   Diagnosis    Hereditary hemochromatosis ([facility] [address] )       Current Outpatient Medications:     B Complex Vitamins (B COMPLEX PO), Take by mouth daily Super energy, Disp: , Rfl:     Biotin 73419 MCG TABS, Take by mouth, Disp: , Rfl:     CALCIUM PO, Take 1,000 mg by mouth daily, Disp: , Rfl:     carvedilol (COREG) 12 5 mg tablet, Take 12 5 mg by mouth 2 (two) times a day with meals, Disp: , Rfl:     Cholecalciferol (VITAMIN D PO), Take 2,000 Units by mouth daily  , Disp: , Rfl:     fexofenadine (ALLEGRA) 60 MG tablet, Take 60 mg by mouth every 12 (twelve) hours as needed, Disp: , Rfl:     ketotifen (ALAWAY) 0 025 % ophthalmic solution, 1 drop as needed, Disp: , Rfl:     Probiotic Product (HEALTHY COLON PO), Take by mouth, Disp: , Rfl:     valsartan-hydrochlorothiazide (DIOVAN-HCT) 320-12 5 MG per tablet, Take 1 tablet by mouth daily, Disp: , Rfl:   Allergies   Allergen Reactions    Biaxin [Clarithromycin]     Claritin [Loratadine]     Macrodantin [Nitrofurantoin Macrocrystal]      Past Surgical History:   Procedure Laterality Date    APPENDECTOMY      DILATION AND CURETTAGE OF UTERUS      HYSTERECTOMY      age 26 per MRS     Social History     Objective:  Vitals:    21 0818   BP: 130/74   BP Location: Left arm   Patient Position: Sitting   Pulse: (!) 52   Resp: 16   Temp: (!) 97 1 °F (36 2 °C)   TempSrc: Tympanic   SpO2: 97%   Weight: 79 7 kg (175 lb 12 8 oz)   Height: 5' 6 5" (1 689 m)     Physical Exam  Constitutional:       Appearance: She is well-developed  HENT:      Head: Normocephalic and atraumatic  Eyes:      Pupils: Pupils are equal, round, and reactive to light  Cardiovascular:      Rate and Rhythm: Normal rate  Heart sounds: No murmur heard  Pulmonary:      Effort: No respiratory distress  Breath sounds: No wheezing or rales  Abdominal:      General: There is no distension  Palpations: Abdomen is soft  Tenderness: There is no abdominal tenderness  There is no rebound  Musculoskeletal:         General: No tenderness  Cervical back: Neck supple  Lymphadenopathy:      Cervical: No cervical adenopathy  Skin:     General: Skin is warm  Findings: No rash  Neurological:      Mental Status: She is alert and oriented to person, place, and time  Deep Tendon Reflexes: Reflexes normal    Psychiatric:         Thought Content: Thought content normal            Labs: I personally reviewed the labs and imaging pertinent to this patient care

## 2021-09-07 ENCOUNTER — APPOINTMENT (OUTPATIENT)
Dept: LAB | Facility: CLINIC | Age: 71
End: 2021-09-07
Payer: MEDICARE

## 2021-09-07 DIAGNOSIS — E83.110 HEREDITARY HEMOCHROMATOSIS (HCC): ICD-10-CM

## 2021-09-07 LAB
ANION GAP SERPL CALCULATED.3IONS-SCNC: 5 MMOL/L (ref 4–13)
BUN SERPL-MCNC: 22 MG/DL (ref 5–25)
CALCIUM SERPL-MCNC: 9.2 MG/DL (ref 8.3–10.1)
CHLORIDE SERPL-SCNC: 104 MMOL/L (ref 100–108)
CO2 SERPL-SCNC: 29 MMOL/L (ref 21–32)
CREAT SERPL-MCNC: 1.37 MG/DL (ref 0.6–1.3)
GFR SERPL CREATININE-BSD FRML MDRD: 38 ML/MIN/1.73SQ M
GLUCOSE SERPL-MCNC: 87 MG/DL (ref 65–140)
POTASSIUM SERPL-SCNC: 4.1 MMOL/L (ref 3.5–5.3)
SODIUM SERPL-SCNC: 138 MMOL/L (ref 136–145)

## 2021-09-07 PROCEDURE — 80048 BASIC METABOLIC PNL TOTAL CA: CPT

## 2021-09-07 PROCEDURE — 36415 COLL VENOUS BLD VENIPUNCTURE: CPT

## 2022-02-15 ENCOUNTER — TELEPHONE (OUTPATIENT)
Dept: HEMATOLOGY ONCOLOGY | Facility: CLINIC | Age: 72
End: 2022-02-15

## 2022-02-15 NOTE — TELEPHONE ENCOUNTER
LVM advising patient of new appointment details for follow up with Dr Adolfo Corrales, Monday 8/29/2022 @ 8:40 AM     [employer] number provided for possible call back

## 2022-07-11 ENCOUNTER — HOSPITAL ENCOUNTER (OUTPATIENT)
Dept: MAMMOGRAPHY | Facility: IMAGING CENTER | Age: 72
Discharge: HOME | End: 2022-07-11
Payer: MEDICARE

## 2022-07-11 VITALS — BODY MASS INDEX: 24.17 KG/M2 | HEIGHT: 67 IN | WEIGHT: 154 LBS

## 2022-07-11 DIAGNOSIS — Z12.31 ENCOUNTER FOR SCREENING MAMMOGRAM FOR MALIGNANT NEOPLASM OF BREAST: ICD-10-CM

## 2022-07-11 PROCEDURE — 77067 SCR MAMMO BI INCL CAD: CPT

## 2022-07-11 PROCEDURE — 77063 BREAST TOMOSYNTHESIS BI: CPT

## 2022-08-19 ENCOUNTER — APPOINTMENT (OUTPATIENT)
Dept: LAB | Facility: CLINIC | Age: 72
End: 2022-08-19
Payer: MEDICARE

## 2022-08-19 DIAGNOSIS — E83.110 HEREDITARY HEMOCHROMATOSIS (HCC): ICD-10-CM

## 2022-08-19 LAB
ERYTHROCYTE [DISTWIDTH] IN BLOOD BY AUTOMATED COUNT: 13.9 % (ref 11.6–15.1)
FERRITIN SERPL-MCNC: 17 NG/ML (ref 8–388)
HCT VFR BLD AUTO: 34.2 % (ref 34.8–46.1)
HGB BLD-MCNC: 10.9 G/DL (ref 11.5–15.4)
IRON SATN MFR SERPL: 13 % (ref 15–50)
IRON SERPL-MCNC: 34 UG/DL (ref 50–170)
MCH RBC QN AUTO: 31 PG (ref 26.8–34.3)
MCHC RBC AUTO-ENTMCNC: 31.9 G/DL (ref 31.4–37.4)
MCV RBC AUTO: 97 FL (ref 82–98)
PLATELET # BLD AUTO: 231 THOUSANDS/UL (ref 149–390)
PMV BLD AUTO: 9.8 FL (ref 8.9–12.7)
RBC # BLD AUTO: 3.52 MILLION/UL (ref 3.81–5.12)
TIBC SERPL-MCNC: 271 UG/DL (ref 250–450)
WBC # BLD AUTO: 6.17 THOUSAND/UL (ref 4.31–10.16)

## 2022-08-19 PROCEDURE — 85027 COMPLETE CBC AUTOMATED: CPT

## 2022-08-19 PROCEDURE — 83550 IRON BINDING TEST: CPT

## 2022-08-19 PROCEDURE — 83540 ASSAY OF IRON: CPT

## 2022-08-19 PROCEDURE — 82728 ASSAY OF FERRITIN: CPT

## 2022-08-19 PROCEDURE — 36415 COLL VENOUS BLD VENIPUNCTURE: CPT

## 2022-08-29 ENCOUNTER — OFFICE VISIT (OUTPATIENT)
Dept: HEMATOLOGY ONCOLOGY | Facility: CLINIC | Age: 72
End: 2022-08-29
Payer: MEDICARE

## 2022-08-29 VITALS
OXYGEN SATURATION: 98 % | TEMPERATURE: 97.1 F | HEIGHT: 67 IN | BODY MASS INDEX: 24.64 KG/M2 | WEIGHT: 157 LBS | DIASTOLIC BLOOD PRESSURE: 84 MMHG | HEART RATE: 51 BPM | RESPIRATION RATE: 17 BRPM | SYSTOLIC BLOOD PRESSURE: 126 MMHG

## 2022-08-29 DIAGNOSIS — E83.110 HEREDITARY HEMOCHROMATOSIS (HCC): Primary | ICD-10-CM

## 2022-08-29 DIAGNOSIS — D63.1 ANEMIA DUE TO STAGE 3A CHRONIC KIDNEY DISEASE (HCC): ICD-10-CM

## 2022-08-29 DIAGNOSIS — N18.31 ANEMIA DUE TO STAGE 3A CHRONIC KIDNEY DISEASE (HCC): ICD-10-CM

## 2022-08-29 PROBLEM — D64.9 ANEMIA, UNSPECIFIED: Status: ACTIVE | Noted: 2022-08-29

## 2022-08-29 PROCEDURE — 99214 OFFICE O/P EST MOD 30 MIN: CPT | Performed by: INTERNAL MEDICINE

## 2022-08-29 NOTE — PROGRESS NOTES
[facility] [place]  [address] [address]  [phone]    Jaskaran Pierre Rivera,1950, [id number]  08/29/22    Discussion:   In summary, this is a 68-year-old female history of hereditary hemochromatosis  Generally she is doing well  She is able to function without restriction  Recent hemoglobin was 10 9, normal white count and platelets  Iron saturation 13%  CMP showed creatinine 1 3  Anemia may have been slightly worse due to EPO deficiency  Observation  Patient also notes that her blood work was drawn shortly after a blood bank phlebotomy  It is possible this could have diminished iron saturation and hemoglobin  She is also mindful about dietary iron intake  For the moment, the system of phlebotomy Q 3-4 months with yearly monitoring seems to be effective  No changes recommended  She is up-to-date with mammography, gyn, colonoscopy  I discussed the above with the patient  The patient  voiced understanding and agreement   ______________________________________________________________________    Chief Complaint   Patient presents with    Follow-up       HPI:  Oncology History    No history exists  Interval History:  Clinically stable  ECOG-  0 - Asymptomatic    Review of Systems   Constitutional: Negative for appetite change, diaphoresis, fatigue and fever  HENT: Negative for sinus pain  Eyes: Negative for discharge  Respiratory: Negative for cough and shortness of breath  Cardiovascular: Negative for chest pain  Gastrointestinal: Negative for abdominal pain, constipation and diarrhea  Endocrine: Negative for cold intolerance  Genitourinary: Negative for difficulty urinating and hematuria  Musculoskeletal: Negative for joint swelling  Skin: Negative for rash  Allergic/Immunologic: Negative for environmental allergies  Neurological: Negative for dizziness and headaches     Hematological: Negative for adenopathy  Psychiatric/Behavioral: Negative for agitation  Past Medical History:   Diagnosis Date    Cardiac disease     right bundle branch block    Fibroids     Hemochromatosis     Right bundle branch block      (spontaneous vaginal delivery)     x2     Patient Active Problem List   Diagnosis    [facility]reditary hemochromatosis ([facility] [address] )    Osteopenia of both hips    Elevated serum creatinine       Current Outpatient Medications:     B Complex Vitamins (B COMPLEX PO), Take by mouth daily Super energy, Disp: , Rfl:     Biotin 04153 MCG TABS, Take by mouth, Disp: , Rfl:     CALCIUM PO, Take 1,000 mg by mouth daily, Disp: , Rfl:     carvedilol (COREG) 12 5 mg tablet, Take 12 5 mg by mouth 2 (two) times a day with meals, Disp: , Rfl:     Cholecalciferol (VITAMIN D PO), Take 2,000 Units by mouth daily  , Disp: , Rfl:     fexofenadine (ALLEGRA) 60 MG tablet, Take 60 mg by mouth every 12 (twelve) hours as needed, Disp: , Rfl:     ketotifen (ZADITOR) 0 025 % ophthalmic solution, 1 drop as needed, Disp: , Rfl:     Probiotic Product (HEALTHY COLON PO), Take by mouth, Disp: , Rfl:     valsartan-hydrochlorothiazide (DIOVAN-HCT) 320-12 5 MG per tablet, Take 1 tablet by mouth daily, Disp: , Rfl:   Allergies   Allergen Reactions    Biaxin [Clarithromycin]     Claritin [Loratadine]     Macrodantin [Nitrofurantoin Macrocrystal]      Past Surgical History:   Procedure Laterality Date    APPENDECTOMY      DILATION AND CURETTAGE OF UTERUS      HYSTERECTOMY      age 26 per MRS     Social History     Objective:  Vitals:    22 0902   BP: 126/84   Pulse: (!) 51   Resp: 17   Temp: (!) 97 1 °F (36 2 °C)   SpO2: 98%   Weight: 71 2 kg (157 lb)   Height: 5' 7" (1 702 m)     Physical Exam  Constitutional:       Appearance: She is well-developed  HENT:      Head: Normocephalic and atraumatic  Eyes:      Pupils: Pupils are equal, round, and reactive to light     Cardiovascular:      Rate and Rhythm: Normal rate  Heart sounds: No murmur heard  Pulmonary:      Effort: No respiratory distress  Breath sounds: No wheezing or rales  Abdominal:      General: There is no distension  Palpations: Abdomen is soft  Tenderness: There is no abdominal tenderness  There is no rebound  Musculoskeletal:         General: No tenderness  Cervical back: Neck supple  Lymphadenopathy:      Cervical: No cervical adenopathy  Skin:     General: Skin is warm  Findings: No rash  Neurological:      Mental Status: She is alert and oriented to person, place, and time  Deep Tendon Reflexes: Reflexes normal    Psychiatric:         Thought Content: Thought content normal            Labs: I personally reviewed the labs and imaging pertinent to this patient care

## 2022-11-17 ENCOUNTER — ANNUAL EXAM (OUTPATIENT)
Dept: OBGYN CLINIC | Facility: CLINIC | Age: 72
End: 2022-11-17
Payer: MEDICARE

## 2022-11-17 VITALS
DIASTOLIC BLOOD PRESSURE: 82 MMHG | BODY MASS INDEX: 25.69 KG/M2 | HEIGHT: 65 IN | SYSTOLIC BLOOD PRESSURE: 140 MMHG | WEIGHT: 154.2 LBS

## 2022-11-17 DIAGNOSIS — K40.90 UNILATERAL INGUINAL HERNIA WITHOUT OBSTRUCTION OR GANGRENE, RECURRENCE NOT SPECIFIED: ICD-10-CM

## 2022-11-17 DIAGNOSIS — Z01.419 ENCNTR FOR GYN EXAM (GENERAL) (ROUTINE) W/O ABN FINDINGS: Primary | ICD-10-CM

## 2022-11-17 DIAGNOSIS — N95.8 OTHER SPECIFIED MENOPAUSAL AND PERIMENOPAUSAL DISORDERS: ICD-10-CM

## 2022-11-17 DIAGNOSIS — M85.80 OSTEOPENIA, UNSPECIFIED LOCATION: ICD-10-CM

## 2022-11-17 DIAGNOSIS — Z12.31 ENCOUNTER FOR SCREENING MAMMOGRAM FOR MALIGNANT NEOPLASM OF BREAST: ICD-10-CM

## 2022-11-17 PROCEDURE — G0101 CA SCREEN;PELVIC/BREAST EXAM: HCPCS | Performed by: OBSTETRICS & GYNECOLOGY

## 2022-11-17 NOTE — PROGRESS NOTES
Kindra Banks   1950    CC:  Yearly exam    S:  74 y o  female here for yearly exam  She is postmenopausal and also s/p hyst and has had no vaginal bleeding  She denies vaginal discharge, itching, odor or dryness  Found lump on groin on L side yesterday when showering  Sexual activity: She is sexually active without pain, bleeding  She does report stress urinary incontinence, urinary concerns, bowel problems, breast concerns  Last Pap: n/a - hyst  Last Mammo: 7/11/22 - [facility] 1  Last Colonoscopy: 12/30/20 - 5yr recall   Last DEXA: 7/7/21 - osteopenia    We reviewed ASCCP guidelines for Pap testing       Family hx of breast cancer: sister, MGM  Family hx of ovarian cancer: no  Family hx of colon cancer: no      Current Outpatient Medications:   •  B Complex Vitamins (B COMPLEX PO), Take by mouth daily Super energy, Disp: , Rfl:   •  Biotin 63874 MCG TABS, Take by mouth, Disp: , Rfl:   •  CALCIUM PO, Take 1,000 mg by mouth daily, Disp: , Rfl:   •  carvedilol (COREG) 12 5 mg tablet, Take 12 5 mg by mouth 2 (two) times a day with meals, Disp: , Rfl:   •  Cholecalciferol (VITAMIN D PO), Take 2,000 Units by mouth daily  , Disp: , Rfl:   •  fexofenadine (ALLEGRA) 60 MG tablet, Take 60 mg by mouth every 12 (twelve) hours as needed, Disp: , Rfl:   •  ketotifen (ZADITOR) 0 025 % ophthalmic solution, 1 drop as needed, Disp: , Rfl:   •  Probiotic Product (HEALTHY COLON PO), Take by mouth, Disp: , Rfl:   Patient Active Problem List   Diagnosis   • Hereditary hemochromatosis ([facility] [address] )   • Osteopenia of both hips   • Elevated serum creatinine   • Anemia, unspecified     Family History   Problem Relation Age of Onset   • Hemochromatosis Mother    • Coronary artery disease Father    • Hypertension Father    • Hemochromatosis Maternal Grandfather    • Hemochromatosis Paternal Uncle    • Hemochromatosis Paternal Uncle    • Hemochromatosis Paternal Uncle    • Breast cancer Sister    • No Known Problems Daughter    • "Breast cancer Maternal Grandmother    • No Known Problems Paternal Grandmother    • No Known Problems Paternal Grandfather    • No Known Problems Sister    • No Known Problems Daughter    • No Known Problems Maternal Aunt    • No Known Problems Paternal Aunt      Past Medical History:   Diagnosis Date   • Cardiac disease     right bundle branch block   • Clotting disorder ([facility] [address] 2002    Hemachromotosis   • Fibroids    • Hemochromatosis    • Hypertension    • Right bundle branch block    •  (spontaneous vaginal delivery)     x2        Review of Systems   Respiratory: Negative  Cardiovascular: Negative  Gastrointestinal: Negative for constipation and diarrhea  Genitourinary: Negative for difficulty urinating, pelvic pain, vaginal bleeding, vaginal discharge, itching or odor  O:  Blood pressure 140/82, height 5' 5 16\" (1 655 m), weight 69 9 kg (154 lb 3 2 oz)  Patient appears well and is not in distress  Breasts are symmetrical without mass, tenderness, nipple discharge, skin changes or adenopathy  Abdomen is soft and nontender without masses  External genitals are normal without lesions or rashes  Urethral meatus and urethra are normal  Bladder is normal to palpation  Vagina is normal without discharge or bleeding, generalized atrophic changes present   Cervix is absent   Uterus is absent  Adnexa are normal, nontender, without palpable mass  Left inguinal hernia when standing, reducible, non-tender     A:  Yearly exam, Osteopenia, Inguinal Hernia     P:   Pap not indicated  Mammo ordered    Colon Cancer Screening up to date   DEXA ordered for     Will follow up with Dr Ayesha Velazquez for hernia     Reviewed considerations of menopause, to call with any postmenopausal bleeding or other concerns  RTO one year for yearly exam or sooner as needed        "

## 2022-11-28 ENCOUNTER — CONSULT (OUTPATIENT)
Dept: SURGERY | Facility: CLINIC | Age: 72
End: 2022-11-28

## 2022-11-28 DIAGNOSIS — R10.9 ABDOMINAL PAIN: Primary | ICD-10-CM

## 2022-11-28 NOTE — PROGRESS NOTES
Assessment/Plan:  Patient presents with a subcutaneous nodule in the left inguinal crease  She does admit to heavy lifting  This is been present over the last 2 weeks  She presently complains of onset of gas as well as constipation  No symptoms of bowel obstruction  Examination a mobile 3 x 3 cm mass is located in the left inguinal crease  Femoral hernia versus adenopathy is postulated  An ultrasound of the abdominal wall is requested  Further recommendations pending this result  There are no diagnoses linked to this encounter  Subjective:      Patient ID: Miguelito Shaw is a 74 y o  female  Presents for evaluation of LLQ hernia  Bulge present since 2022  Denies pain or bowel concerns  The following portions of the patient's history were reviewed and updated as appropriate:     She  has a past medical history of Cardiac disease, Clotting disorder ([address] ) (), Fibroids, Hemochromatosis, Hypertension, Right bundle branch block, and  (spontaneous vaginal delivery)  She  has a past surgical history that includes Appendectomy; Dilation and curettage of uterus; and Hysterectomy  Her family history includes Breast cancer in her maternal grandmother and sister; Coronary artery disease in her father; Hemochromatosis in her maternal grandfather, mother, paternal uncle, paternal uncle, and paternal uncle; Hypertension in her father; No Known Problems in her daughter, daughter, maternal aunt, paternal aunt, paternal grandfather, paternal grandmother, and sister  She  reports that she has never smoked  She has never used smokeless tobacco  She reports current alcohol use  She reports that she does not use drugs    Current Outpatient Medications   Medication Sig Dispense Refill   • B Complex Vitamins (B COMPLEX PO) Take by mouth daily Super energy     • Biotin 77153 MCG TABS Take by mouth     • CALCIUM PO Take 1,000 mg by mouth daily     • carvedilol (COREG) 12 5 mg tablet Take 12 5 mg by mouth 2 (two) times a day with meals     • Cholecalciferol (VITAMIN D PO) Take 2,000 Units by mouth daily       • fexofenadine (ALLEGRA) 60 MG tablet Take 60 mg by mouth every 12 (twelve) hours as needed     • ketotifen (ZADITOR) 0 025 % ophthalmic solution 1 drop as needed     • Probiotic Product (HEALTHY COLON PO) Take by mouth       No current facility-administered medications for this visit  She is allergic to biaxin [clarithromycin], claritin [loratadine], and macrodantin [nitrofurantoin macrocrystal]       Review of Systems   Constitutional: Negative  Negative for activity change  HENT: Negative  Eyes: Negative  Respiratory: Negative  Cardiovascular: Negative  Gastrointestinal: Positive for abdominal distention and constipation  Endocrine: Negative  Genitourinary: Negative  Musculoskeletal: Negative  Skin: Negative  Allergic/Immunologic: Negative  Neurological: Negative  Psychiatric/Behavioral: Negative for agitation, behavioral problems and confusion  The patient is not nervous/anxious  Objective: There were no vitals taken for this visit  Physical Exam  Constitutional:       Appearance: Normal appearance  She is well-developed  HENT:      Head: Normocephalic and atraumatic  Nose: Nose normal    Eyes:      Extraocular Movements: Extraocular movements intact  Conjunctiva/sclera: Conjunctivae normal    Cardiovascular:      Rate and Rhythm: Normal rate and regular rhythm  Heart sounds: Normal heart sounds  Pulmonary:      Effort: Pulmonary effort is normal       Breath sounds: Normal breath sounds  Abdominal:      General: Abdomen is flat  Comments: Pre cm mass in the left inguinal crease his present  Mild tenderness to palpation  Incarcerated left femoral hernia versus adenopathy  Musculoskeletal:         General: Normal range of motion  Skin:     General: Skin is warm and dry     Neurological:      Mental Status: She is alert and oriented to person, place, and time     Psychiatric:         Mood and Affect: Mood normal

## 2022-11-30 ENCOUNTER — TREATMENT (OUTPATIENT)
Dept: SURGERY | Facility: CLINIC | Age: 72
End: 2022-11-30

## 2022-11-30 ENCOUNTER — HOSPITAL ENCOUNTER (OUTPATIENT)
Dept: RADIOLOGY | Age: 72
Discharge: HOME | End: 2022-11-30

## 2022-11-30 DIAGNOSIS — R10.9 ABDOMINAL PAIN: ICD-10-CM

## 2022-11-30 DIAGNOSIS — I86.8 VENOUS ANEURYSM: Primary | ICD-10-CM

## 2022-11-30 NOTE — PROGRESS NOTES
Superficial venous aneurysm noted in the palpable area of concern in the left inguinal crease  Vascular consultation placed for evaluation of davis up

## 2022-12-01 ENCOUNTER — CONSULT (OUTPATIENT)
Dept: VASCULAR SURGERY | Facility: CLINIC | Age: 72
End: 2022-12-01

## 2022-12-01 VITALS
HEART RATE: 62 BPM | SYSTOLIC BLOOD PRESSURE: 144 MMHG | BODY MASS INDEX: 25.66 KG/M2 | WEIGHT: 154 LBS | HEIGHT: 65 IN | DIASTOLIC BLOOD PRESSURE: 76 MMHG

## 2022-12-01 DIAGNOSIS — I86.8 VENOUS ANEURYSM: ICD-10-CM

## 2022-12-01 NOTE — ASSESSMENT & PLAN NOTE
68-year-old female with past medical history of hereditary hemochromatosis, hypertension, CKD presenting with concerns for left femoral vein aneurysm  Patient had been recently seen by general surgeon for concern for potential left inguinal/femoral hernia  During the evaluation it was noted to be a lump that led to a ultrasound of the groin  The ultrasound did not show a hernia however it did show what appeared to be the left superficial femoral vein measuring 2 4 cm  Is noted the patient did have a venous duplex done in 2016 due to thrombophlebitis of her left leg  At that time there was no noted abnormalities that were seen  On my exam the patient does have a lump in the left groin that is most notable in the standing position and with Valsalva  Appears to disappear while in a supine position  Unfortunately this is not a dedicated venous duplex and more information is needed  I discussed with the patient the risk of DVTs in lower extremity aneurysms  The patient is anxious about the idea of potential development of DVTs with femoral venous aneurysms  She is interested in surgical repair  Although venorraphy is an option, I would like a better understanding of what is happening with a lower extremity venous duplex before considering intervention  Until then I recommend the patient continue to be active  Avoid any sedentary activities  Once venous duplex comes back I will discussed with the patient the next appropriate steps

## 2022-12-01 NOTE — PROGRESS NOTES
Assessment/Plan:    No problem-specific Assessment & Plan notes found for this encounter  Diagnoses and all orders for this visit:    Venous aneurysm  -     Ambulatory Referral to Vascular Surgery  -     VAS lower limb venous duplex study, unilateral/limited; Future          Subjective:      Patient ID: Dagoberto Urrutia is a 74 y o  female  Pt is new and was ref by Yuliya Davenport MD for venous aneurysm  Pt had US 11/30/22  Pt says she has some discomfort in the inguinal area yesterday but denies any pain today  HPI   75-year-old female with a past medical history of hereditary hemochromatosis, hypertension, right bundle branch block, appendectomy, hysterectomy, anemia, presenting to our office as a referral from general surgery  Patient had been seen General surgery to concern of a lump in her left groin which she had noted over the past few weeks  Patient states the lump is most notable when she is in a standing position  She is active and uses her ProAir at home  States usually this does not bother her however she did not notice about a day or 2 ago she had some pain in the left groin crease  During her workup by General surgery they got an ultrasound of the left groin that did not see a hernia however it did see what was concerning for a left superficial femoral vein aneurysms which had led to her referral to our office  No history of DVT however patient did have thrombophlebitis seen in the left lower leg in 2016 for which he had a venous ultrasound done  At that time they did not notice any abnormalities of the veins or arteries  States her sister did have a history of a brain aneurysm no other aneurysms or vascular abnormalities noted within her family or herself        The following portions of the patient's history were reviewed and updated as appropriate: allergies, current medications, past family history, past medical history, past social history, past surgical history and problem list     I have spent 45 minutes with Patient and family today in which greater than 50% of this time was spent in counseling/coordination of care regarding Diagnostic results, Prognosis, Risks and benefits of tx options, Intructions for management, Patient and family education, Importance of tx compliance, Risk factor reductions and Impressions  Review of Systems   Constitutional: Negative  HENT: Negative  Eyes: Negative  Respiratory: Negative  Cardiovascular: Negative  Gastrointestinal: Positive for abdominal pain  Endocrine: Negative  Genitourinary: Negative  Musculoskeletal: Negative  Skin: Negative  Allergic/Immunologic: Negative  Neurological: Negative  Hematological: Negative  Psychiatric/Behavioral: Negative  Objective: There were no vitals taken for this visit  Physical Exam  Constitutional:       Appearance: Normal appearance  HENT:      Head: Normocephalic  Mouth/Throat:      Mouth: Mucous membranes are moist       Pharynx: Oropharynx is clear  Eyes:      Extraocular Movements: Extraocular movements intact  Pupils: Pupils are equal, round, and reactive to light  Cardiovascular:      Rate and Rhythm: Normal rate and regular rhythm  Pulses:           Femoral pulses are 2+ on the right side and 2+ on the left side  Comments: Palpable soft lump in the left groin just superior to the groin crease medial to the femoral artery but lateral to the pubic tubercle most notable in standing position not palpable while supine  Pulmonary:      Effort: Pulmonary effort is normal    Abdominal:      General: Abdomen is flat  Tenderness: There is no abdominal tenderness  Musculoskeletal:      Cervical back: Normal range of motion  Right lower leg: No edema  Left lower leg: No edema  Skin:     General: Skin is warm and dry  Neurological:      General: No focal deficit present        Mental Status: She is alert and oriented to person, place, and time     Psychiatric:         Mood and Affect: Mood normal          Behavior: Behavior normal

## 2022-12-19 ENCOUNTER — HOSPITAL ENCOUNTER (OUTPATIENT)
Dept: NON INVASIVE DIAGNOSTICS | Facility: CLINIC | Age: 72
Discharge: HOME | End: 2022-12-19

## 2022-12-19 DIAGNOSIS — I86.8 VENOUS ANEURYSM: ICD-10-CM

## 2022-12-20 ENCOUNTER — TELEPHONE (OUTPATIENT)
Dept: OTHER | Facility: HOSPITAL | Age: 72
End: 2022-12-20

## 2022-12-20 NOTE — QUICK NOTE
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

## 2023-01-17 ENCOUNTER — TELEPHONE (OUTPATIENT)
Dept: VASCULAR SURGERY | Facility: CLINIC | Age: 73
End: 2023-01-17

## 2023-01-17 ENCOUNTER — TELEPHONE (OUTPATIENT)
Dept: OTHER | Facility: HOSPITAL | Age: 73
End: 2023-01-17

## 2023-01-17 DIAGNOSIS — I86.8 VENOUS ANEURYSM: Primary | ICD-10-CM

## 2023-01-17 PROBLEM — D63.1 ANEMIA DUE TO STAGE 3A CHRONIC KIDNEY DISEASE (HCC): Status: ACTIVE | Noted: 2023-01-17

## 2023-01-17 PROBLEM — N18.31 ANEMIA DUE TO STAGE 3A CHRONIC KIDNEY DISEASE (HCC): Status: ACTIVE | Noted: 2023-01-17

## 2023-01-17 NOTE — PROGRESS NOTES
Was able to get in contact with the patient on the phone  Patient is interested in discussing the surgical options as well as the risks and recovery of the procedure  We discussed that risks are relatively low but they include bleeding, infection, recurrence of aneurysm, wound dehiscence  Overall feeling the patient be a good candidate for high ligation of the great saphenous vein  Will contact the office to get her scheduled    Operative Scheduling Information:    Hospital:  [facility]    Physician:  Claudio Yung    Surgery: Left great saphenous vein ligation    Urgency:  Standard    Level:  Level 4: Outpatients to be scheduled for screening procedures and elective surgery that can be delayed for longer than one month without reasonable expectation of detriment to patient      Case Length:  Normal    Post-op Bed:  Outpatient    OR Table:  Standard    Equipment Needs:  None    Medication Instructions:  None    Hydration:  No    Contrast Allergy:  no

## 2023-01-17 NOTE — TELEPHONE ENCOUNTER
From: Ankush Weeks <[email or web address]   Sent: Tuesday, January 17, 2023 2:36 PM  To: Vascular Surgery Schedulers [email or web address]  Subject: Jj Velasco,    I have this patient that needs an open left saphenous vein ligation  Standard room  No special equipment  If we can get her in to [facility] within the next 4 weeks I'd appreciate it       Thank you

## 2023-01-23 ENCOUNTER — PREP FOR PROCEDURE (OUTPATIENT)
Dept: VASCULAR SURGERY | Facility: CLINIC | Age: 73
End: 2023-01-23

## 2023-01-23 NOTE — TELEPHONE ENCOUNTER
Authorization requirements reviewed  Please refer to [address] / Ketty Ours number [id number] for case updates

## 2023-01-23 NOTE — TELEPHONE ENCOUNTER
Verified patient's insurance   CONFIRMED - Patient's insurance is Medicare  Is patient requesting a call when authorization has been obtained? Patient did not request a call  Surgery Date: 2/14/23  Primary Surgeon: Wali Reaves // Hank Garzon (NPI: [id number])  Assisting Surgeon: Not Applicable (N/A)  Facility: [facility] (Tax: [id number] / NPI: [id number])  Inpatient / Outpatient: Outpatient  Level: 3    Clearance Received: No clearance ordered  Consent Received: Consent will be signed day of procedure  Medication Hold / Last Dose: Not Applicable (N/A)  VQI Spreadsheet: Not Applicable (N/A)  IR Notified: Not Applicable (N/A)  Rep   Notified: Not Applicable (N/A)  Equipment Needs: Not Applicable (N/A)  Vas Lab Requested: Not Applicable (N/A)  Patient Contacted: 1/23/23 Mitch Paez s/w patient     Diagnosis: I86 8  Procedure/ CPT Code(s): LIGATION of Greater Saphenous Vein of the left upper leg // CPT: 77454    For varicose vein related procedures:   Last LEVDR: Not Applicable (N/A)  CEAP Classification: Not Applicable (N/A)  VCSS: Not Applicable (N/A)    Post Operative Date/ Time: 2/17/23 , 10:45am Carmine with Hank Garzon (NPI: [id number])     Pt will have blood work and ekg done at [place] [address]

## 2023-02-02 ENCOUNTER — LAB REQUISITION (OUTPATIENT)
Dept: LAB | Facility: HOSPITAL | Age: 73
End: 2023-02-02

## 2023-02-02 ENCOUNTER — OFFICE VISIT (OUTPATIENT)
Dept: LAB | Facility: CLINIC | Age: 73
End: 2023-02-02

## 2023-02-02 ENCOUNTER — APPOINTMENT (OUTPATIENT)
Dept: LAB | Facility: CLINIC | Age: 73
End: 2023-02-02

## 2023-02-02 DIAGNOSIS — I86.8 VENOUS ANEURYSM: ICD-10-CM

## 2023-02-02 DIAGNOSIS — I86.8 VARICOSE VEINS OF OTHER SPECIFIED SITES: ICD-10-CM

## 2023-02-02 LAB
ABO GROUP BLD: NORMAL
ANION GAP SERPL CALCULATED.3IONS-SCNC: 4 MMOL/L (ref 4–13)
ATRIAL RATE: 48 BPM
BLD GP AB SCN SERPL QL: NEGATIVE
BUN SERPL-MCNC: 15 MG/DL (ref 5–25)
CALCIUM SERPL-MCNC: 9.4 MG/DL (ref 8.4–10.2)
CHLORIDE SERPL-SCNC: 107 MMOL/L (ref 96–108)
CO2 SERPL-SCNC: 30 MMOL/L (ref 21–32)
CREAT SERPL-MCNC: 1.01 MG/DL (ref 0.6–1.3)
ERYTHROCYTE [DISTWIDTH] IN BLOOD BY AUTOMATED COUNT: 14.8 % (ref 11.6–15.1)
GFR SERPL CREATININE-BSD FRML MDRD: 54 ML/MIN/1.73SQ M
GLUCOSE P FAST SERPL-MCNC: 94 MG/DL (ref 65–99)
HCT VFR BLD AUTO: 39.1 % (ref 34.8–46.1)
HGB BLD-MCNC: 12.4 G/DL (ref 11.5–15.4)
MCH RBC QN AUTO: 30.8 PG (ref 26.8–34.3)
MCHC RBC AUTO-ENTMCNC: 31.7 G/DL (ref 31.4–37.4)
MCV RBC AUTO: 97 FL (ref 82–98)
PLATELET # BLD AUTO: 206 THOUSANDS/UL (ref 149–390)
PMV BLD AUTO: 10.3 FL (ref 8.9–12.7)
POTASSIUM SERPL-SCNC: 3.9 MMOL/L (ref 3.5–5.3)
PR INTERVAL: 152 MS
QRS AXIS: -26 DEGREES
QRSD INTERVAL: 120 MS
QT INTERVAL: 466 MS
QTC INTERVAL: 416 MS
RBC # BLD AUTO: 4.02 MILLION/UL (ref 3.81–5.12)
RH BLD: POSITIVE
SODIUM SERPL-SCNC: 141 MMOL/L (ref 135–147)
SPECIMEN EXPIRATION DATE: NORMAL
T WAVE AXIS: 8 DEGREES
VENTRICULAR RATE: 48 BPM
WBC # BLD AUTO: 4.78 THOUSAND/UL (ref 4.31–10.16)

## 2023-02-06 NOTE — PRE-PROCEDURE INSTRUCTIONS
Pre-Surgery Instructions:   Medication Instructions   • B Complex Vitamins (B COMPLEX PO) Stop taking 7 days prior to surgery  • Biotin 17945 MCG TABS Stop taking 7 days prior to surgery  • CALCIUM PO Stop taking 7 days prior to surgery  • carvedilol (COREG) 12 5 mg tablet Take day of surgery  • Cholecalciferol (VITAMIN D PO) Stop taking 7 days prior to surgery  • Ferrous Sulfate (IRON PO) Hold day of surgery  • fexofenadine (ALLEGRA) 60 MG tablet Uses PRN- OK to take day of surgery   • Probiotic Product (HEALTHY COLON PO) Take day of surgery  • Propylene Glycol (SYSTANE COMPLETE OP) Take day of surgery  Preop bathing and medication instructions reviewed with pt via telephone call  Pt verbalizes understanding  Instructed pt no alcohol, drugs or smoking 24 hrs prior to surgery  No vitamins or supplements (not ordered by a physician) for 7 days prior to surgery  Pt states the only supplement prescribed is the iron  No NSAIDS 5-7 days prior to surgery  Tylenol is OK to use  NPO after midnight the night prior to surgery  The hospital will call the pt with time and instructions one business day prior to surgery  Pt verbalizes understanding and all questions/concerns addressed

## 2023-02-13 ENCOUNTER — ANESTHESIA EVENT (OUTPATIENT)
Dept: PERIOP | Facility: HOSPITAL | Age: 73
End: 2023-02-13

## 2023-02-14 ENCOUNTER — HOSPITAL ENCOUNTER (OUTPATIENT)
Facility: HOSPITAL | Age: 73
Setting detail: OUTPATIENT SURGERY
Discharge: HOME | End: 2023-02-14
Attending: SURGERY | Admitting: SURGERY

## 2023-02-14 ENCOUNTER — ANESTHESIA (OUTPATIENT)
Dept: PERIOP | Facility: HOSPITAL | Age: 73
End: 2023-02-14

## 2023-02-14 VITALS
HEART RATE: 54 BPM | TEMPERATURE: 97.5 F | HEIGHT: 66 IN | DIASTOLIC BLOOD PRESSURE: 82 MMHG | WEIGHT: 151 LBS | OXYGEN SATURATION: 97 % | SYSTOLIC BLOOD PRESSURE: 168 MMHG | BODY MASS INDEX: 24.27 KG/M2 | RESPIRATION RATE: 18 BRPM

## 2023-02-14 DIAGNOSIS — I86.8 VENOUS ANEURYSM: ICD-10-CM

## 2023-02-14 RX ORDER — CHLORHEXIDINE GLUCONATE 0.12 MG/ML
15 RINSE ORAL ONCE
Status: DISCONTINUED | OUTPATIENT
Start: 2023-02-14 | End: 2023-02-14

## 2023-02-14 RX ORDER — FENTANYL CITRATE/PF 50 MCG/ML
25 SYRINGE (ML) INJECTION
Status: DISCONTINUED | OUTPATIENT
Start: 2023-02-14 | End: 2023-02-14 | Stop reason: HOSPADM

## 2023-02-14 RX ORDER — TRAMADOL HYDROCHLORIDE 50 MG/1
50 TABLET ORAL EVERY 6 HOURS PRN
Qty: 10 TABLET | Refills: 0 | Status: SHIPPED | OUTPATIENT
Start: 2023-02-14 | End: 2023-02-24

## 2023-02-14 RX ORDER — FENTANYL CITRATE 50 UG/ML
INJECTION, SOLUTION INTRAMUSCULAR; INTRAVENOUS AS NEEDED
Status: DISCONTINUED | OUTPATIENT
Start: 2023-02-14 | End: 2023-02-14

## 2023-02-14 RX ORDER — MAGNESIUM HYDROXIDE 1200 MG/15ML
LIQUID ORAL AS NEEDED
Status: DISCONTINUED | OUTPATIENT
Start: 2023-02-14 | End: 2023-02-14 | Stop reason: HOSPADM

## 2023-02-14 RX ORDER — TRAMADOL HYDROCHLORIDE 50 MG/1
50 TABLET ORAL EVERY 6 HOURS PRN
Status: DISCONTINUED | OUTPATIENT
Start: 2023-02-14 | End: 2023-02-14 | Stop reason: HOSPADM

## 2023-02-14 RX ORDER — BUPIVACAINE HYDROCHLORIDE 5 MG/ML
INJECTION, SOLUTION EPIDURAL; INTRACAUDAL AS NEEDED
Status: DISCONTINUED | OUTPATIENT
Start: 2023-02-14 | End: 2023-02-14 | Stop reason: HOSPADM

## 2023-02-14 RX ORDER — GLYCOPYRROLATE 0.2 MG/ML
INJECTION INTRAMUSCULAR; INTRAVENOUS AS NEEDED
Status: DISCONTINUED | OUTPATIENT
Start: 2023-02-14 | End: 2023-02-14

## 2023-02-14 RX ORDER — ONDANSETRON 2 MG/ML
4 INJECTION INTRAMUSCULAR; INTRAVENOUS ONCE AS NEEDED
Status: DISCONTINUED | OUTPATIENT
Start: 2023-02-14 | End: 2023-02-14 | Stop reason: HOSPADM

## 2023-02-14 RX ORDER — LIDOCAINE HYDROCHLORIDE 10 MG/ML
INJECTION, SOLUTION EPIDURAL; INFILTRATION; INTRACAUDAL; PERINEURAL AS NEEDED
Status: DISCONTINUED | OUTPATIENT
Start: 2023-02-14 | End: 2023-02-14

## 2023-02-14 RX ORDER — SODIUM CHLORIDE, SODIUM LACTATE, POTASSIUM CHLORIDE, CALCIUM CHLORIDE 600; 310; 30; 20 MG/100ML; MG/100ML; MG/100ML; MG/100ML
INJECTION, SOLUTION INTRAVENOUS CONTINUOUS PRN
Status: DISCONTINUED | OUTPATIENT
Start: 2023-02-14 | End: 2023-02-14

## 2023-02-14 RX ORDER — PROPOFOL 10 MG/ML
INJECTION, EMULSION INTRAVENOUS AS NEEDED
Status: DISCONTINUED | OUTPATIENT
Start: 2023-02-14 | End: 2023-02-14

## 2023-02-14 RX ORDER — ACETAMINOPHEN 325 MG/1
650 TABLET ORAL ONCE
Status: COMPLETED | OUTPATIENT
Start: 2023-02-14 | End: 2023-02-14

## 2023-02-14 RX ORDER — ONDANSETRON 2 MG/ML
INJECTION INTRAMUSCULAR; INTRAVENOUS AS NEEDED
Status: DISCONTINUED | OUTPATIENT
Start: 2023-02-14 | End: 2023-02-14

## 2023-02-14 RX ORDER — DEXAMETHASONE SODIUM PHOSPHATE 10 MG/ML
INJECTION, SOLUTION INTRAMUSCULAR; INTRAVENOUS AS NEEDED
Status: DISCONTINUED | OUTPATIENT
Start: 2023-02-14 | End: 2023-02-14

## 2023-02-14 RX ORDER — CALCIUM CHLORIDE 100 MG/ML
INJECTION INTRAVENOUS; INTRAVENTRICULAR AS NEEDED
Status: DISCONTINUED | OUTPATIENT
Start: 2023-02-14 | End: 2023-02-14

## 2023-02-14 RX ORDER — SODIUM CHLORIDE, SODIUM LACTATE, POTASSIUM CHLORIDE, CALCIUM CHLORIDE 600; 310; 30; 20 MG/100ML; MG/100ML; MG/100ML; MG/100ML
20 INJECTION, SOLUTION INTRAVENOUS CONTINUOUS
Status: DISCONTINUED | OUTPATIENT
Start: 2023-02-14 | End: 2023-02-14 | Stop reason: HOSPADM

## 2023-02-14 RX ORDER — CEFAZOLIN SODIUM 1 G/3ML
INJECTION, POWDER, FOR SOLUTION INTRAMUSCULAR; INTRAVENOUS AS NEEDED
Status: DISCONTINUED | OUTPATIENT
Start: 2023-02-14 | End: 2023-02-14

## 2023-02-14 RX ADMIN — LIDOCAINE HYDROCHLORIDE 50 MG: 10 INJECTION, SOLUTION EPIDURAL; INFILTRATION; INTRACAUDAL; PERINEURAL at 13:29

## 2023-02-14 RX ADMIN — CALCIUM CHLORIDE 1 G: 100 INJECTION INTRAVENOUS; INTRAVENTRICULAR at 13:56

## 2023-02-14 RX ADMIN — CEFAZOLIN 2000 MG: 1 INJECTION, POWDER, FOR SOLUTION INTRAMUSCULAR; INTRAVENOUS at 13:40

## 2023-02-14 RX ADMIN — CEFAZOLIN 2000 MG: 1 INJECTION, POWDER, FOR SOLUTION INTRAMUSCULAR; INTRAVENOUS at 13:35

## 2023-02-14 RX ADMIN — SODIUM CHLORIDE, SODIUM LACTATE, POTASSIUM CHLORIDE, AND CALCIUM CHLORIDE 20 ML/HR: .6; .31; .03; .02 INJECTION, SOLUTION INTRAVENOUS at 12:15

## 2023-02-14 RX ADMIN — PHENYLEPHRINE HYDROCHLORIDE 50 MCG/MIN: 10 INJECTION INTRAVENOUS at 14:04

## 2023-02-14 RX ADMIN — SODIUM CHLORIDE, SODIUM LACTATE, POTASSIUM CHLORIDE, AND CALCIUM CHLORIDE: .6; .31; .03; .02 INJECTION, SOLUTION INTRAVENOUS at 14:52

## 2023-02-14 RX ADMIN — ONDANSETRON 4 MG: 2 INJECTION INTRAMUSCULAR; INTRAVENOUS at 13:26

## 2023-02-14 RX ADMIN — FENTANYL CITRATE 50 MCG: 50 INJECTION, SOLUTION INTRAMUSCULAR; INTRAVENOUS at 13:23

## 2023-02-14 RX ADMIN — SODIUM CHLORIDE, SODIUM LACTATE, POTASSIUM CHLORIDE, AND CALCIUM CHLORIDE: .6; .31; .03; .02 INJECTION, SOLUTION INTRAVENOUS at 12:55

## 2023-02-14 RX ADMIN — DEXAMETHASONE SODIUM PHOSPHATE 10 MG: 10 INJECTION, SOLUTION INTRAMUSCULAR; INTRAVENOUS at 13:29

## 2023-02-14 RX ADMIN — PROPOFOL 50 MG: 10 INJECTION, EMULSION INTRAVENOUS at 13:30

## 2023-02-14 RX ADMIN — GLYCOPYRROLATE 0.1 MG: 0.2 INJECTION, SOLUTION INTRAMUSCULAR; INTRAVENOUS at 13:26

## 2023-02-14 RX ADMIN — FENTANYL CITRATE 50 MCG: 50 INJECTION, SOLUTION INTRAMUSCULAR; INTRAVENOUS at 13:27

## 2023-02-14 RX ADMIN — ACETAMINOPHEN 650 MG: 325 TABLET, FILM COATED ORAL at 17:25

## 2023-02-14 RX ADMIN — PROPOFOL 150 MG: 10 INJECTION, EMULSION INTRAVENOUS at 13:29

## 2023-02-14 NOTE — DISCHARGE INSTR - AVS FIRST PAGE
DISCHARGE INSTRUCTIONS  ARTERIOGRAM/ANGIOPLASTY/STENT    ACTIVITY: On the evening following the procedure, you should be mostly resting  Someone should remain with you during the evening and overnight following the procedure  On the day after your procedure, limit your activity to walking  Avoid heavy lifting (no more than 15 lbs) for the first week  Walking up steps and normal activities may be resumed as you feel ready  You should not drive a car until seen in the clinic  You may ride in a car  If you have any questions regarding a particular activity, please discuss with your doctor or nurse before you are discharged  DIET:  Resume your normal diet  Drink more water than usual for the next 24 hours  PROCEDURE SITE: You may have a procedure site in your groin, arm, or foot  You may have surgical glue at your procedure site  The glue is used to cover the procedure site, assist in closure, and prevent contamination  This adhesive will darken and peel away on its own within one to two weeks  Do not pick at it  You should shower daily  Wash incision daily with soap and water, but do not rub or scrub the incision; rinse thoroughly and pat dry  Do not bathe in a tub or swim for the first 2 week following your procedure or if you have any open wounds  It is normal to have some bruising, swelling or discoloration around the procedure site  IF increasing redness, pain, or a bulge develops, call our office immediately  If present, you may remove the band-aid or “steri-strips” over your procedure site after two days  If you notice any active bleeding at the site, apply pressure to the site and call our office ([phone]) or [phone]  FOLLOW UP STUDIES:  Your doctor will discuss whether further treatments or follow-up studies are necessary at your first post procedure visit      FOLLOW UP APPOINTMENTS:  Making and keeping follow up appointments and ultrasound tests are important to your recovery  If you have difficulty making it to or keeping your follow up appointments, call the office  If you have increased pain, fever >101 5, increased drainage, redness or a bad smell at your surgery site, new coldness/numbness of your arm or leg, please call us immediately and GO directly to the ER  PLEASE CALL THE OFFICE IF YOU HAVE ANY QUESTIONS  [phone]  [phone]  [address] [place] [address] , [address], [place], [address]  [address], [address], [facility], [address] [facility]  [address], [facility], [address] [facility]  [address] [facility], [address] [facility], [facility], [address]    [address] L[address], [address]st Floor, Nini Weiss [address]  [facility], [address] [facility], [address]  [address] [facility], [address] [facility], [address]  [address] [facility], [address] [facility], [address] [facility], Viola Nichols 6  [address] [facility], [address]  [address] [place], [place] [address]

## 2023-02-14 NOTE — H&P
H&P Exam - Vascular Surgery   Gianna Mallory 74 y o  female MRN: [id number]  Unit/Bed#: OR [place] Encounter: [id number]    Assessment/Plan     Assessment/Plan:  77-year-old female with past medical history of hereditary hemochromatosis, hypertension, CKD found to have an aneurysm of the left GSV  We discussed conservative management vs surgery which would include ligation, she is interested in surgery and is here for her procedure today    History of Present Illness   HPI:  Gianna Mallory is a 74 y o  female who presents with an asymptomatic left GSV aneurysm found during a work up to rule out a left inguinal hernia  No hx of trauma, aneurysms or DVT    Review of Systems   All other systems reviewed and are negative        Historical Information   Past Medical History:   Diagnosis Date   • Blood clot in vein     right leg   • Cardiac disease     right bundle branch block   • Clotting disorder [address] [address] )     Hemachromotosis   • Fibroids    • Hemochromatosis    • Hypertension    • Right bundle branch block    •  (spontaneous vaginal delivery)     x2     Past Surgical History:   Procedure Laterality Date   • APPENDECTOMY     • DILATION AND CURETTAGE OF UTERUS     • HYSTERECTOMY      age 26 per MRS     Social History   Social History     Substance and Sexual Activity   Alcohol Use Yes    Comment: 1/2 glass of wine     Social History     Substance and Sexual Activity   Drug Use No     Social History     Tobacco Use   Smoking Status Never   Smokeless Tobacco Never     E-Cigarette/Vaping   • E-Cigarette Use Never User      E-Cigarette/Vaping Substances   • Nicotine No    • THC No    • CBD No    • Flavoring No    • Other No    • Unknown No      Family History: non-contributory    Meds/Allergies   all current active meds have been reviewed  Allergies   Allergen Reactions   • Biaxin [Clarithromycin] GI Intolerance and Headache   • Claritin [Loratadine] GI Intolerance and Headache   • Macrodantin [Nitrofurantoin Macrocrystal] GI Intolerance and Headache       Objective   Vitals: Height 5' 6" (1 676 m), weight 68 kg (150 lb)  ,Body mass index is 24 21 kg/m²  No intake or output data in the 24 hours ending 02/14/23 1123  Invasive Devices     None                 Physical Exam  Vitals and nursing note reviewed  Constitutional:       General: She is not in acute distress  Appearance: She is well-developed  HENT:      Head: Normocephalic and atraumatic  Eyes:      Conjunctiva/sclera: Conjunctivae normal    Cardiovascular:      Rate and Rhythm: Normal rate and regular rhythm  Heart sounds: No murmur heard  Pulmonary:      Effort: Pulmonary effort is normal  No respiratory distress  Breath sounds: Normal breath sounds  Abdominal:      Palpations: Abdomen is soft  Tenderness: There is no abdominal tenderness  Musculoskeletal:         General: No swelling  Cervical back: Neck supple  Skin:     General: Skin is warm and dry  Capillary Refill: Capillary refill takes less than 2 seconds  Neurological:      Mental Status: She is alert  Psychiatric:         Mood and Affect: Mood normal          Lab Results: I have personally reviewed pertinent reports  Imaging: I have personally reviewed pertinent reports  EKG, Pathology, and Other Studies: I have personally reviewed pertinent reports        Code Status: No Order  Advance Directive and Living Will:      Power of :    POLST:      Counseling / Coordination of Care  None ER physician

## 2023-02-14 NOTE — ANESTHESIA POSTPROCEDURE EVALUATION
Post-Op Assessment Note    CV Status:  Stable  Pain Score: 0    Pain management: adequate     Mental Status:  Sleepy   Hydration Status:  Stable   PONV Controlled:  None   Airway Patency:  Patent      Post Op Vitals Reviewed: Yes      Staff: CRNA         No notable events documented      /72 (02/14/23 1528)    Temp (!) 97 °F (36 1 °C) (02/14/23 1528)    Pulse 60 (02/14/23 1528)   Resp 15 (02/14/23 1528)    SpO2 100 % (02/14/23 1528)

## 2023-02-14 NOTE — ANESTHESIA PREPROCEDURE EVALUATION
Medical History  History Comments   Cardiac disease right bundle branch block   Hemochromatosis    Right bundle branch block    Fibroids     (spontaneous vaginal delivery) x2   Hypertension    Clotting disorder (HCC) Hemachromotosis   Blood clot in vein right leg     Procedure:  ligation of left great saphenous vein (Left: Groin)    Relevant Problems   ANESTHESIA (within normal limits)      /RENAL   (+) Anemia due to stage 3a chronic kidney disease (HCC)      HEMATOLOGY   (+) Anemia due to stage 3a chronic kidney disease (HCC)   (+) Anemia, unspecified        Physical Exam    Airway    Mallampati score: II  TM Distance: >3 FB  Neck ROM: full     Dental       Cardiovascular  Rate: normal,     Pulmonary  Pulmonary exam normal     Other Findings  Per pt denies anything remaining that is loose or removeable        Anesthesia Plan  ASA Score- 2     Anesthesia Type- general with ASA Monitors  Additional Monitors:   Airway Plan: ETT and LMA  Plan Factors-Exercise tolerance (METS): >4 METS  Chart reviewed  Patient summary reviewed  Patient is not a current smoker  Induction- intravenous  Postoperative Plan- Plan for postoperative opioid use  Informed Consent- Anesthetic plan and risks discussed with patient  I personally reviewed this patient with the CRNA  Discussed and agreed on the Anesthesia Plan with the ELIZABETH Pacheco

## 2023-02-15 NOTE — OP NOTE
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]    Post-Op Diagnosis Codes: * Venous aneurysm [I86 8]    Procedure(s):  Left - ligation of left great saphenous vein with excision of GSV aneurysm    Specimen(s):  ID Type Source Tests Collected by Time Destination   1 : left great saphenous vein aneurysm Tissue Vein TISSUE EXAM Tatum George MD 2023  2:34 PM        Estimated Blood Loss:   10 mL    Drains:  * No LDAs found *    Anesthesia Type:   Choice    Operative Indications:  Venous aneurysm [I808]  58-year-old female with 2 3 cm proximal GSV aneurysm    Operative Findings:  Aneurysm identified about 1 cm from the saphenofemoral junction    The saphenous vein was ligated at the saphenofemoral junction and the aneurysm was excised  The end of the saphenous vein was suture-ligated with a 3-0 silk    Complications:   None    Procedure and Technique:  After informed consent was obtained, the patient was brought to the operating room and placed in the supine position  Perioperative IV antibiotics were given  She was given anesthesia and an LMA was placed  She was then prepped and draped in the usual sterile fashion exposing the left groin  A timeout was performed  Ultrasound was used to identify the saphenofemoral junction and the aneurysm  An oblique incision was made in the left groin over the saphenofemoral junction  Dissection was taken down using electrocautery  We dissected through the soft tissue and were able to identify the great saphenous vein  We continued dissection circumferentially around the vein inferior to the aneurysm and placed a vessel loop around it  We continued dissection then more proximally towards the saphenofemoral junction    Once we identify saphenofemoral junction we dissected circumferentially around the vein freeing it fully  The aneurysm was roughly 1 to 2 cm from the junction  We placed a Pollack clamp on the vein at the junction and transected it  The vein was then oversewed using a 4-0 Prolene in a 2 layer fashion  We then excised the aneurysm and suture-ligated the end of the great saphenous vein with a 3-0 silk  Hemostasis was achieved  I then closed the femoral sheath with a 3-0 Vicryl followed by a 3-0 Vicryl for the camper's and Nadir's layer  The deep dermis was closed with 3-0 Vicryl followed by 4 Monocryl for the skin  Dermabond was placed over the skin incision   The patient was then extubated and taken to PACU for recovery     I was present for the entire procedure    Patient Disposition:  PACU         SIGNATURE: Katt Monahan MD  DATE: February 15, 2023  TIME: 4:25 PM

## 2023-02-16 ENCOUNTER — TELEPHONE (OUTPATIENT)
Dept: VASCULAR SURGERY | Facility: CLINIC | Age: 73
End: 2023-02-16

## 2023-02-16 NOTE — TELEPHONE ENCOUNTER
She states the discomfort feels "heavy" in the distal leg  The entire leg is swollen  She states that they measured the calf and thigh compared to the R leg and both the calf and thigh are 1 inch larger than the other leg

## 2023-02-16 NOTE — TELEPHONE ENCOUNTER
Pt s/p ligation of L GSV w/ excision of GSV aneurysm 2/14 by Dr El Lopez  Pt called the office today to report that this morning she noticed her L leg is swollen and she is having discomfort in the distal leg  Pt states that she did not have any symptoms yesterday  She has not taken anything OTC for pain  She denies fever/chills  She denies any erythema around the incision or drainage  Informed pt I would send a message to our triage provider and we will call her back

## 2023-02-16 NOTE — TELEPHONE ENCOUNTER
Swelling can by expected post surgery  Would encourage pt to elevate leg  What kind of "discomfort" in distal leg? Is the whole leg swollen?

## 2023-02-16 NOTE — TELEPHONE ENCOUNTER
Ok-would continue to monitor and elevate legs for edema control  The heavy bleeding is most likely secondary to edema  Advised to call the office with new symptoms or worsening edema

## 2023-02-17 ENCOUNTER — OFFICE VISIT (OUTPATIENT)
Dept: VASCULAR SURGERY | Facility: CLINIC | Age: 73
End: 2023-02-17

## 2023-02-17 VITALS
BODY MASS INDEX: 24.27 KG/M2 | HEIGHT: 66 IN | HEART RATE: 50 BPM | WEIGHT: 151 LBS | DIASTOLIC BLOOD PRESSURE: 86 MMHG | SYSTOLIC BLOOD PRESSURE: 120 MMHG

## 2023-02-17 DIAGNOSIS — I86.8 VENOUS ANEURYSM: Primary | ICD-10-CM

## 2023-02-17 NOTE — PROGRESS NOTES
Assessment/Plan:    Venous aneurysm  20-year-old female status ligation of the GSV with excision of the GSV aneurysm on 2/14/2023  Overall I am pleased with patient's recovery  She is only postop day 2 but she is to begin to ambulate  Pain is well controlled  Incision is clean dry and intact  Does have swelling of the left lower extremity as well as some discomfort along the medial aspect which is expected after GSV ligation  Discussed with the patient the symptoms of a DVT and what to look out for  She can return in 6 weeks however can call the office if there is any issues prior appointment       Diagnoses and all orders for this visit:    Venous aneurysm        Subjective:      Patient ID: Dacia Swanson is a 74 y o  female  Patient present for a L saphenous vein ligation 2/14/23  Patient c/o pain on the left  Inner thigh area  Patient states she feels really sore and is concern she might have a DVT  Patient states her left calve is sore as well  Incision is on the left groin area, patient states it can be painful when moving  HPI  20-year-old female status ligation of the GSV with excision of the GSV aneurysm  Patient post procedure is doing well does have some discomfort in the left leg along the medial aspect of the thigh  There is some swelling of the left lower extremity as well  She has not needed any narcotic medication since her procedure  She is controlling her pain with Tylenol  She is trying to ambulate more every day      The following portions of the patient's history were reviewed and updated as appropriate: allergies, current medications, past family history, past medical history, past social history, past surgical history and problem list     I have spent a total time of 25 minutes on 02/17/23 in caring for this patient including Prognosis, Risks and benefits of tx options, Instructions for management, Patient and family education, Importance of tx compliance, Risk factor reductions, Impressions, Counseling / Coordination of care, Documenting in the medical record, Reviewing / ordering tests, medicine, procedures   and Obtaining or reviewing history    Review of Systems   Constitutional: Negative  HENT: Negative  Eyes: Negative  Respiratory: Negative  Cardiovascular: Negative  Gastrointestinal: Negative  Endocrine: Negative  Genitourinary: Negative  Musculoskeletal: Negative  Skin: Negative  Allergic/Immunologic: Negative  Neurological: Negative  Hematological: Negative  Psychiatric/Behavioral: Negative  Objective:      /86 (BP Location: Left arm, Patient Position: Sitting, Cuff Size: Adult)   Pulse (!) 50   Ht 5' 6" (1 676 m)   Wt 68 5 kg (151 lb)   BMI 24 37 kg/m²          Physical Exam  Constitutional:       Appearance: Normal appearance  HENT:      Head: Normocephalic  Mouth/Throat:      Mouth: Mucous membranes are moist       Pharynx: Oropharynx is clear  Eyes:      Extraocular Movements: Extraocular movements intact  Pupils: Pupils are equal, round, and reactive to light  Cardiovascular:      Rate and Rhythm: Normal rate and regular rhythm  Pulmonary:      Effort: Pulmonary effort is normal    Abdominal:      General: Abdomen is flat  Tenderness: There is no abdominal tenderness  Musculoskeletal:      Cervical back: Normal range of motion  Right lower leg: No edema  Left lower leg: No edema  Skin:     General: Skin is warm and dry  Neurological:      General: No focal deficit present  Mental Status: She is alert and oriented to person, place, and time     Psychiatric:         Mood and Affect: Mood normal          Behavior: Behavior normal

## 2023-02-17 NOTE — ASSESSMENT & PLAN NOTE
49-year-old female status ligation of the GSV with excision of the GSV aneurysm on 2/14/2023  Overall I am pleased with patient's recovery  She is only postop day 2 but she is to begin to ambulate  Pain is well controlled  Incision is clean dry and intact  Does have swelling of the left lower extremity as well as some discomfort along the medial aspect which is expected after GSV ligation  Discussed with the patient the symptoms of a DVT and what to look out for    She can return in 6 weeks however can call the office if there is any issues prior appointment

## 2023-02-24 ENCOUNTER — TELEPHONE (OUTPATIENT)
Dept: VASCULAR SURGERY | Facility: CLINIC | Age: 73
End: 2023-02-24

## 2023-02-24 DIAGNOSIS — I86.8 VENOUS ANEURYSM: ICD-10-CM

## 2023-02-24 DIAGNOSIS — I87.2 VENOUS INSUFFICIENCY OF BOTH LOWER EXTREMITIES: Primary | ICD-10-CM

## 2023-02-24 NOTE — TELEPHONE ENCOUNTER
Patient had left GSV aneurysm ligation with dr Graeme Tong on 2/14   She saw him post op on 2/17 and had some swelling of the left loswer extremity as well as tenderness medially  She called today because last night her left thigh was more swollen, warm, and tender  This morning it is somewhat better, but still swollen and tender  Will send to vascular triage to advise

## 2023-02-25 ENCOUNTER — HOSPITAL ENCOUNTER (OUTPATIENT)
Dept: VASCULAR ULTRASOUND | Facility: HOSPITAL | Age: 73
Discharge: HOME | End: 2023-02-25

## 2023-02-25 DIAGNOSIS — I87.2 VENOUS INSUFFICIENCY OF BOTH LOWER EXTREMITIES: ICD-10-CM

## 2023-04-05 ENCOUNTER — OFFICE VISIT (OUTPATIENT)
Dept: VASCULAR SURGERY | Facility: CLINIC | Age: 73
End: 2023-04-05

## 2023-04-05 VITALS
WEIGHT: 151 LBS | BODY MASS INDEX: 24.27 KG/M2 | SYSTOLIC BLOOD PRESSURE: 140 MMHG | DIASTOLIC BLOOD PRESSURE: 80 MMHG | HEIGHT: 66 IN | HEART RATE: 48 BPM

## 2023-04-05 DIAGNOSIS — I86.8 VENOUS ANEURYSM: Primary | ICD-10-CM

## 2023-04-05 NOTE — ASSESSMENT & PLAN NOTE
77-year-old female status post left proximal saphenous vein ligation with excision of venous aneurysm on 2/14/2023  Patient doing well post procedure  The venous aneurysm has been fully excised and GSV has been ligated  Not surprising she had an episode of thrombophlebitis postprocedure after the GSV ligation  Patient recovered well incision appears clean dry and intact groin is soft  Discussed with the patient based on our exam no need for further imaging  At this point, patient can follow-up with us as needed and return to normal physical activity

## 2023-04-05 NOTE — PROGRESS NOTES
Assessment/Plan:    Venous aneurysm  25-year-old female status post left proximal saphenous vein ligation with excision of venous aneurysm on 2/14/2023  Patient doing well post procedure  The venous aneurysm has been fully excised and GSV has been ligated  Not surprising she had an episode of thrombophlebitis postprocedure after the GSV ligation  Patient recovered well incision appears clean dry and intact groin is soft  Discussed with the patient based on our exam no need for further imaging  At this point, patient can follow-up with us as needed and return to normal physical activity  Diagnoses and all orders for this visit:    Venous aneurysm        Subjective:      Patient ID: Lion Flanagan is a 74 y o  female  Patient present for a Post Op Vein ligation done on 2/14/23  Patient states she had some discomfort on her left thigh if she seats for too long  HPI  25-year-old female status post left proximal saphenous vein ligation with excision of venous aneurysm on 2/14/2023  Patient postprocedure did have an episode of some tenderness along the medial aspect of her left leg  She had a duplex done that showed there was thrombophlebitis of the great saphenous vein this has resolved with conservative measures and she is no longer complaining of any pain along the medial thigh  Patient has been increasing her activity at home and does describe some mild discomfort in her left thigh however is not limiting      The following portions of the patient's history were reviewed and updated as appropriate: allergies, current medications, past family history, past medical history, past social history, past surgical history and problem list     I have spent a total time of 25 minutes on 04/05/23 in caring for this patient including Diagnostic results, Prognosis, Risks and benefits of tx options, Instructions for management, Patient and family education, Importance of tx compliance, Risk factor reductions, "Impressions, Counseling / Coordination of care, Documenting in the medical record, Reviewing / ordering tests, medicine, procedures   and Obtaining or reviewing history    Review of Systems   Constitutional: Negative  HENT: Negative  Eyes: Negative  Respiratory: Negative  Cardiovascular: Negative  Gastrointestinal: Negative  Endocrine: Negative  Genitourinary: Negative  Musculoskeletal: Negative  Skin: Negative  Neurological: Negative  Hematological: Negative  Psychiatric/Behavioral: Negative  Objective:      /80 (BP Location: Right arm, Patient Position: Sitting, Cuff Size: Adult)   Pulse (!) 48   Ht 5' 6\" (1 676 m)   Wt 68 5 kg (151 lb)   BMI 24 37 kg/m²          Physical Exam  Constitutional:       Appearance: Normal appearance  HENT:      Head: Normocephalic  Mouth/Throat:      Mouth: Mucous membranes are moist       Pharynx: Oropharynx is clear  Eyes:      Extraocular Movements: Extraocular movements intact  Pupils: Pupils are equal, round, and reactive to light  Cardiovascular:      Rate and Rhythm: Normal rate and regular rhythm  Pulmonary:      Effort: Pulmonary effort is normal    Abdominal:      General: Abdomen is flat  Tenderness: There is no abdominal tenderness  Musculoskeletal:      Cervical back: Normal range of motion  Right lower leg: No edema  Left lower leg: No edema  Skin:     General: Skin is warm and dry  Comments: Incision healing, c/d/i, groin soft   Neurological:      General: No focal deficit present  Mental Status: She is alert and oriented to person, place, and time     Psychiatric:         Mood and Affect: Mood normal          Behavior: Behavior normal          "

## 2023-05-18 ENCOUNTER — OFFICE VISIT (OUTPATIENT)
Dept: PODIATRY | Facility: CLINIC | Age: 73
End: 2023-05-18
Payer: MEDICARE

## 2023-05-18 VITALS
DIASTOLIC BLOOD PRESSURE: 88 MMHG | HEIGHT: 66 IN | HEART RATE: 48 BPM | SYSTOLIC BLOOD PRESSURE: 170 MMHG | BODY MASS INDEX: 25.23 KG/M2 | WEIGHT: 157 LBS

## 2023-05-18 DIAGNOSIS — L85.1 ACQUIRED PLANTAR POROKERATOSIS: ICD-10-CM

## 2023-05-18 DIAGNOSIS — M79.672 PAIN IN LEFT FOOT: Primary | ICD-10-CM

## 2023-05-18 PROCEDURE — 99202 OFFICE O/P NEW SF 15 MIN: CPT | Performed by: PODIATRIST

## 2023-06-05 NOTE — PROGRESS NOTES
"Assessment/Plan:    No problem-specific Assessment & Plan notes found for this encounter  Diagnoses and all orders for this visit:    Acquired plantar porokeratosis  Pain in left foot  Treatment options for this type of lesion discussed  Surgical excision generally is not a good idea because the remnant scar tissue is generally as much of a problem as the lesion was  Recommend conservative approach using shoes with extra accommodative padding  Recommend routine foot care as needed for paring of benign lesion  Call if any signs of infection are noted  Follow-up as needed  Subjective:      Patient ID: Yakov Ojeda is a 74 y o  female  5/18/2023: 28-year-old female presents concerned with very painful left foot lesion  Reports she can barely walk on this now  Patient states she had a treated several years ago and believes she was an old patient of mine  Records were not available to verify such  The following portions of the patient's history were reviewed and updated as appropriate: allergies, current medications, past family history, past medical history, past social history, past surgical history and problem list     Review of Systems   Constitutional: Negative  HENT: Negative  Eyes: Negative  Respiratory: Negative  Cardiovascular: Negative  Gastrointestinal: Negative  Endocrine: Negative  Genitourinary: Negative  Musculoskeletal: Negative  Skin:        Painful lesion on left foot   Allergic/Immunologic: Negative  Neurological: Negative  Hematological: Negative  Psychiatric/Behavioral: Negative  Objective:      /88   Pulse (!) 48   Ht 5' 6\" (1 676 m)   Wt 71 2 kg (157 lb)   BMI 25 34 kg/m²          Physical Exam  Constitutional:       Appearance: Normal appearance  HENT:      Head: Normocephalic        Right Ear: External ear normal       Left Ear: External ear normal    Eyes:      Pupils: Pupils are equal, round, and reactive to " light  Cardiovascular:      Rate and Rhythm: Normal rate  Pulses:           Dorsalis pedis pulses are 1+ on the right side and 1+ on the left side  Posterior tibial pulses are 1+ on the right side and 1+ on the left side  Pulmonary:      Effort: Pulmonary effort is normal    Musculoskeletal:      Cervical back: Normal range of motion  Feet:      Right foot:      Protective Sensation: 10 sites tested  10 sites sensed  Skin integrity: Skin integrity normal       Left foot:      Protective Sensation: 10 sites tested  10 sites sensed  Skin integrity: Callus present  Skin:     General: Skin is warm and dry  Capillary Refill: Capillary refill takes 2 to 3 seconds  Comments: 0 5 cm² nucleated hyperkeratotic lesion left foot Sub fifth MPJ  Moderate pain with palpation  Porokeratotic nature  Texture tone and turgor are diminished with mild atrophic changes noted  Digital hair noted  Neurological:      General: No focal deficit present  Mental Status: She is alert     Psychiatric:         Mood and Affect: Mood normal

## 2023-07-18 ENCOUNTER — HOSPITAL ENCOUNTER (OUTPATIENT)
Dept: BONE DENSITY | Facility: IMAGING CENTER | Age: 73
Discharge: HOME | End: 2023-07-18
Payer: MEDICARE

## 2023-07-18 ENCOUNTER — HOSPITAL ENCOUNTER (OUTPATIENT)
Dept: MAMMOGRAPHY | Facility: IMAGING CENTER | Age: 73
Discharge: HOME | End: 2023-07-18
Payer: MEDICARE

## 2023-07-18 VITALS — WEIGHT: 156.8 LBS | HEIGHT: 65 IN | BODY MASS INDEX: 26.12 KG/M2

## 2023-07-18 VITALS — WEIGHT: 156 LBS | HEIGHT: 65 IN | BODY MASS INDEX: 25.99 KG/M2

## 2023-07-18 DIAGNOSIS — N95.8 OTHER SPECIFIED MENOPAUSAL AND PERIMENOPAUSAL DISORDERS: ICD-10-CM

## 2023-07-18 DIAGNOSIS — Z12.31 ENCOUNTER FOR SCREENING MAMMOGRAM FOR MALIGNANT NEOPLASM OF BREAST: ICD-10-CM

## 2023-07-18 DIAGNOSIS — M85.80 OSTEOPENIA, UNSPECIFIED LOCATION: ICD-10-CM

## 2023-07-18 PROCEDURE — 77080 DXA BONE DENSITY AXIAL: CPT

## 2023-07-18 PROCEDURE — 77067 SCR MAMMO BI INCL CAD: CPT

## 2023-07-18 PROCEDURE — 77063 BREAST TOMOSYNTHESIS BI: CPT

## 2023-07-20 ENCOUNTER — TELEPHONE (OUTPATIENT)
Dept: OBGYN CLINIC | Facility: CLINIC | Age: 73
End: 2023-07-20

## 2023-07-20 NOTE — TELEPHONE ENCOUNTER
----- Message from Faye Elliott MD sent at 7/20/2023 12:40 PM EDT -----  Bone density reviewed - there is a decrease in her left hip (not yet osteroporosis). Her fracture risk is now elevated, so would consider treatment.   Please refer to PCP or endo for further eval.

## 2023-08-10 ENCOUNTER — TELEPHONE (OUTPATIENT)
Dept: HEMATOLOGY ONCOLOGY | Facility: CLINIC | Age: 73
End: 2023-08-10

## 2023-08-10 NOTE — TELEPHONE ENCOUNTER
Lab Inquiry   Who are you speaking with? Patient     If it is not the patient, are they listed on an active communication consent form? N/A   Name of ordering provider Dr. Porfirio Aranda   What is being requested?  Lab orders need to be entered   Lab draw location [facility]   What is the best call back number? n/a                                                 Confirmed lab in chart

## 2023-08-24 ENCOUNTER — OFFICE VISIT (OUTPATIENT)
Dept: HEMATOLOGY ONCOLOGY | Facility: CLINIC | Age: 73
End: 2023-08-24
Payer: MEDICARE

## 2023-08-24 VITALS
SYSTOLIC BLOOD PRESSURE: 110 MMHG | HEIGHT: 65 IN | TEMPERATURE: 97.7 F | HEART RATE: 52 BPM | OXYGEN SATURATION: 90 % | WEIGHT: 157.2 LBS | BODY MASS INDEX: 26.19 KG/M2 | DIASTOLIC BLOOD PRESSURE: 80 MMHG | RESPIRATION RATE: 17 BRPM

## 2023-08-24 DIAGNOSIS — E83.110 HEREDITARY HEMOCHROMATOSIS (HCC): Primary | ICD-10-CM

## 2023-08-24 PROCEDURE — 99213 OFFICE O/P EST LOW 20 MIN: CPT | Performed by: INTERNAL MEDICINE

## 2023-08-24 NOTE — PROGRESS NOTES
[facility] [place]  [address] [address] [phone]  Hematology Ambulatory Follow-Up  Sergey Cedeno, 1950, [id number]  8/24/2023      Assessment and Plan   1. Hereditary hemochromatosis ([address])  In summary this is a 69-year old female with history of hereditary hematochromatosis. Overall she is doing well. No recent iron panel. Her CBC from our last visit showed anemia however this is thought to be due to having blood work following phlebotomy. Most recent CBC from 6m ago now shows resolution. Patient was on iron supplements for a couple months earlier this year as instructed by her PCP, now discontinued. Last phlebotomy was in April. She is holding off on additional phlebotomy until repeat iron panel is completed which is reasonable. Patient will have repeat CBC and Iron panel today, then again annually. 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 She is following with vascular surgery. Sister has history of brain aneurysm. Mammogram and colonoscopy are up to date. The patient is scheduled for follow-up in approximately 1 year with Dr. Gayle Valadez   Patient voiced agreement and understanding to the above. Patient advised to call the Hematology/Oncology office with any questions and concerns regarding the above. Barrier(s) to care: None  The patient is able to self care.     Medical Oncology/Hematology  [address]    Subjective     Chief Complaint   Patient presents with   • Follow-up       History of present illness:   77-year-old female history of hereditary hemochromatosis who presents for follow up.      08/24/23 :  Lab Results   Component Value Date    IRON 34 (L) 08/19/2022    TIBC 271 08/19/2022    FERRITIN 17 08/19/2022     Lab Results   Component Value Date    WBC 4.78 02/02/2023    HGB 12.4 02/02/2023    HCT 39.1 02/02/2023    MCV 97 02/02/2023     02/02/2023 Interval history: Patient feeling well. Denies acute complaints. 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 She had been on oral iron for a couple months earlier this year as instructed by her PCP for low iron levels. Discontinued due to constipation. Review of Systems   Constitutional: Negative for chills, fever and unexpected weight change. Respiratory: Negative for cough and shortness of breath. Cardiovascular: Negative for chest pain and palpitations. Gastrointestinal: Negative for abdominal pain. Musculoskeletal: Negative for arthralgias. Skin: Negative for color change and rash. Neurological: Negative for dizziness and light-headedness. All other systems reviewed and are negative.       Patient Active Problem List   Diagnosis   • Hereditary hemochromatosis ([address])   • Osteopenia of both hips   • Elevated serum creatinine   • Anemia, unspecified   • Unilateral inguinal hernia without obstruction or gangrene   • Abdominal pain   • Venous aneurysm   • Anemia due to stage 3a chronic kidney disease (HCC)     Past Medical History:   Diagnosis Date   • Blood clot in vein     right leg   • Cardiac disease     right bundle branch block   • Clotting disorder ([address]) 2002    Hemachromotosis   • Fibroids    • Hemochromatosis    • Hypertension    • Right bundle branch block    •  (spontaneous vaginal delivery)     x2     Past Surgical History:   Procedure Laterality Date   • APPENDECTOMY     • DILATION AND CURETTAGE OF UTERUS     • HYSTERECTOMY      age 26 per MRS   • NE LIG&DIV LONG SAPH VEIN SAPHFEM JUNCT/INTERRUPJ Left 2023    Procedure: ligation of left great saphenous vein;  Surgeon: Chapincito Keane MD;  Location[facility];  Service: Vascular     Family History   Problem Relation Age of Onset   • Hemochromatosis Mother    • Coronary artery disease Father    • Hypertension Father • Breast cancer Sister         age unknown   • No Known Problems Sister    • No Known Problems Daughter    • No Known Problems Daughter    • Breast cancer Maternal Grandmother         age unknown   • Hemochromatosis Maternal Grandfather    • No Known Problems Paternal Grandmother    • No Known Problems Paternal Grandfather    • No Known Problems Maternal Aunt    • No Known Problems Paternal Aunt    • Hemochromatosis Paternal Uncle    • Hemochromatosis Paternal Uncle    • Hemochromatosis Paternal Uncle      Social History     Socioeconomic History   • Marital status: /Civil Union     Spouse name: Not on file   • Number of children: Not on file   • Years of education: Not on file   • Highest education level: Not on file   Occupational History   • Not on file   Tobacco Use   • Smoking status: Never   • Smokeless tobacco: Never   Vaping Use   • Vaping Use: Never used   Substance and Sexual Activity   • Alcohol use: Yes     Comment: 1/2 glass of wine   • Drug use: No   • Sexual activity: Yes     Partners: Male     Birth control/protection: Surgical     Comment:    Other Topics Concern   • Not on file   Social History Narrative   • Not on file     Social Determinants of Health     Financial Resource Strain: Not on file   Food Insecurity: Not on file   Transportation Needs: Not on file   Physical Activity: Not on file   Stress: Not on file   Social Connections: Not on file   Intimate Partner Violence: Not on file   Housing Stability: Not on file       Current Outpatient Medications:   •  B Complex Vitamins (B COMPLEX PO), Take by mouth daily Super energy, Disp: , Rfl:   •  Biotin 95779 MCG TABS, Take by mouth, Disp: , Rfl:   •  CALCIUM PO, Take 1,000 mg by mouth daily, Disp: , Rfl:   •  carvedilol (COREG) 12.5 mg tablet, Take 12.5 mg by mouth 2 (two) times a day with meals, Disp: , Rfl:   •  Cholecalciferol (VITAMIN D PO), Take 2,000 Units by mouth daily  , Disp: , Rfl:   •  fexofenadine (ALLEGRA) 60 MG tablet, Take 60 mg by mouth every 12 (twelve) hours as needed, Disp: , Rfl:   •  Probiotic Product (HEALTHY COLON PO), Take by mouth, Disp: , Rfl:   •  Ferrous Sulfate (IRON PO), Take by mouth, Disp: , Rfl:   •  ketotifen (ZADITOR) 0.025 % ophthalmic solution, 1 drop as needed, Disp: , Rfl:   •  Propylene Glycol (SYSTANE COMPLETE OP), Apply to eye, Disp: , Rfl:   Allergies   Allergen Reactions   • Biaxin [Clarithromycin] GI Intolerance and Headache   • Claritin [Loratadine] GI Intolerance and Headache   • Macrodantin [Nitrofurantoin Macrocrystal] GI Intolerance and Headache   • Strawberry C [Ascorbate - Food Allergy] Itching and Throat Swelling       Objective   /80 (BP Location: Left arm, Patient Position: Sitting, Cuff Size: Adult)   Pulse (!) 52   Temp 97.7 °F (36.5 °C)   Resp 17   Ht 5' 5" (1.651 m)   Wt 71.3 kg (157 lb 3.2 oz)   SpO2 90%   BMI 26.16 kg/m²    Physical Exam  Vitals reviewed. HENT:      Head: Normocephalic. Eyes:      Extraocular Movements: Extraocular movements intact. Pupils: Pupils are equal, round, and reactive to light. Cardiovascular:      Rate and Rhythm: Normal rate and regular rhythm. Pulmonary:      Effort: Pulmonary effort is normal.      Breath sounds: Normal breath sounds. Abdominal:      Palpations: Abdomen is soft. Tenderness: There is no abdominal tenderness. Musculoskeletal:      Cervical back: Neck supple. Skin:     Findings: No rash. Neurological:      General: No focal deficit present. Mental Status: She is alert.    Psychiatric:         Mood and Affect: Mood normal.         Result Review  Labs:  Lab Results   Component Value Date    WBC 4.78 02/02/2023    HGB 12.4 02/02/2023    HCT 39.1 02/02/2023    MCV 97 02/02/2023     02/02/2023     Lab Results   Component Value Date    IRON 34 (L) 08/19/2022    TIBC 271 08/19/2022    FERRITIN 17 08/19/2022       Imaging:   DXA bone density spine hip and pelvis  Narrative: DXA SCAN    CLINICAL HISTORY: 74 years postmenopausal female. OTHER RISK FACTORS:  None. PHARMACOLOGIC THERAPY FOR OSTEOPOROSIS:  None. TECHNIQUE: Bone densitometry was performed using a Hologic Horizon A bone densitometer. Regions of interest appear properly placed. COMPARISON: 7/7/2021. RESULTS:    LUMBAR SPINE  Level: L1, L3, L4  (L2 vertebra excluded from analysis due to local structural abnormalities or artifact):  BMD: 0.871 gm/cm2  T-score: -1.7    LEFT  TOTAL HIP:  BMD: 0.692 gm/cm2  T-score: -2.0    LEFT  FEMORAL NECK:  BMD: 0.611 gm/cm2  T score: -2.1  Impression: 1. Low bone mass (osteopenia). 2.  Since a DXA study from 7/7/2021, there has been:  A  STATISTICALLY SIGNIFICANT DECREASE in bone mineral density of 0.029 g/cm2 (4.1%) in the left total hip. 3.  The 10 year risk of hip fracture is 3.9% with the 10 year risk of major osteoporotic fracture being 14% as calculated by the [facility] fracture risk assessment tool (FRAX, which is based on data generated by the [facility]   for Metabolic Bone Diseases). 4.  The current NOF guidelines recommend treating patients with a T-score of -2.5 or less in the lumbar spine or hips, or in post-menopausal women and men over the age of 48 with low bone mass (osteopenia) and a FRAX 10 year risk score of >3% for hip   fracture and/or >20% for major osteoporotic fracture. 5.  The NOF recommends follow-up DXA in 1-2 years after initiating therapy for osteoporosis and every 2 years thereafter. More frequent evaluation is appropriate for patients with conditions associated with rapid bone loss, such as glucocorticoid   therapy. The interval between DXA screenings may be longer for individuals without major risk factors and initial T-score in the normal or upper low bone mass range.     The FRAX algorithm has certain limitations:  -FRAX has not been validated in patients currently or previously treated with pharmacotherapy for osteoporosis. In such patients, clinical judgment must be exercised in interpreting FRAX scores. -Prior hip, vertebral and humeral fragility fractures appear to confer greater risk of subsequent fracture than fractures at other sites (this is especially true for individuals with severe vertebral fractures), but quantification of this incremental   risk is not possible with FRAX. -FRAX underestimates fracture risk in patients with history of multiple fragility fractures. -FRAX may underestimate fracture risk in patients with history of frequent falls.  -It is not appropriate to use FRAX to monitor treatment response. WHO CLASSIFICATION:  Normal (a T-score of -1.0 or higher)  Low bone mineral density (a T-score of less than -1.0 but higher than -2.5)  Osteoporosis (a T-score of -2.5 or less)  Severe osteoporosis (a T-score of -2.5 or less with a fragility fracture)    LEAST SIGNIFICANT CHANGE (AT 95% C. I):  Lumbar spine: 0.025 g/cm2; 2.8%  Total hip: 0.025 g/cm2; 3.7%  Forearm: 0.012 g/cm2; 1.9%    Workstation performed: [id number]    Please note: This report has been generated by a voice recognition software system. Therefore there may be syntax, spelling, and/or grammatical errors. Please call if you have any questions.

## 2023-08-28 ENCOUNTER — APPOINTMENT (OUTPATIENT)
Dept: LAB | Facility: CLINIC | Age: 73
End: 2023-08-28
Payer: MEDICARE

## 2023-08-28 DIAGNOSIS — E83.110 HEREDITARY HEMOCHROMATOSIS (HCC): ICD-10-CM

## 2023-08-28 LAB
BASOPHILS # BLD AUTO: 0.06 THOUSANDS/ÂΜL (ref 0–0.1)
BASOPHILS NFR BLD AUTO: 1 % (ref 0–1)
EOSINOPHIL # BLD AUTO: 0.39 THOUSAND/ÂΜL (ref 0–0.61)
EOSINOPHIL NFR BLD AUTO: 8 % (ref 0–6)
ERYTHROCYTE [DISTWIDTH] IN BLOOD BY AUTOMATED COUNT: 12.7 % (ref 11.6–15.1)
FERRITIN SERPL-MCNC: 55 NG/ML (ref 11–307)
HCT VFR BLD AUTO: 40 % (ref 34.8–46.1)
HGB BLD-MCNC: 13.2 G/DL (ref 11.5–15.4)
IMM GRANULOCYTES # BLD AUTO: 0.01 THOUSAND/UL (ref 0–0.2)
IMM GRANULOCYTES NFR BLD AUTO: 0 % (ref 0–2)
IRON SATN MFR SERPL: 37 % (ref 15–50)
IRON SERPL-MCNC: 99 UG/DL (ref 50–212)
LYMPHOCYTES # BLD AUTO: 1.61 THOUSANDS/ÂΜL (ref 0.6–4.47)
LYMPHOCYTES NFR BLD AUTO: 34 % (ref 14–44)
MCH RBC QN AUTO: 32.8 PG (ref 26.8–34.3)
MCHC RBC AUTO-ENTMCNC: 33 G/DL (ref 31.4–37.4)
MCV RBC AUTO: 100 FL (ref 82–98)
MONOCYTES # BLD AUTO: 0.45 THOUSAND/ÂΜL (ref 0.17–1.22)
MONOCYTES NFR BLD AUTO: 10 % (ref 4–12)
NEUTROPHILS # BLD AUTO: 2.2 THOUSANDS/ÂΜL (ref 1.85–7.62)
NEUTS SEG NFR BLD AUTO: 47 % (ref 43–75)
NRBC BLD AUTO-RTO: 0 /100 WBCS
PLATELET # BLD AUTO: 196 THOUSANDS/UL (ref 149–390)
PMV BLD AUTO: 9.8 FL (ref 8.9–12.7)
RBC # BLD AUTO: 4.02 MILLION/UL (ref 3.81–5.12)
TIBC SERPL-MCNC: 267 UG/DL (ref 250–450)
UIBC SERPL-MCNC: 168 UG/DL (ref 155–355)
WBC # BLD AUTO: 4.72 THOUSAND/UL (ref 4.31–10.16)

## 2023-08-28 PROCEDURE — 83540 ASSAY OF IRON: CPT

## 2023-08-28 PROCEDURE — 83550 IRON BINDING TEST: CPT

## 2023-08-28 PROCEDURE — 82728 ASSAY OF FERRITIN: CPT

## 2023-08-28 PROCEDURE — 85025 COMPLETE CBC W/AUTO DIFF WBC: CPT

## 2023-08-28 PROCEDURE — 36415 COLL VENOUS BLD VENIPUNCTURE: CPT

## 2023-11-28 ENCOUNTER — TELEPHONE (OUTPATIENT)
Dept: HEMATOLOGY ONCOLOGY | Facility: CLINIC | Age: 73
End: 2023-11-28

## 2023-11-28 NOTE — TELEPHONE ENCOUNTER
Appointment Change  Cancel, Reschedule, Change to Virtual      Who are you speaking with? Patient   If it is not the patient, is the caller listed on the communication consent form? N/A   Which provider is the appointment scheduled with? Dr. Apollo Patterson   When was the original appointment scheduled? Please list date and time                      08/26/2024 @8AM    At which location is the appointment scheduled to take place? [facility]   Was the appointment rescheduled? Was the appointment changed from an in person visit to a virtual visit? If so, please list the details of the change. YES, 09/04/2024 @8:40AM    What is the reason for the appointment change?  Provider unavailable

## 2024-08-30 ENCOUNTER — HOSPITAL ENCOUNTER (OUTPATIENT)
Dept: MAMMOGRAPHY | Facility: IMAGING CENTER | Age: 74
Discharge: HOME | End: 2024-08-30
Payer: MEDICARE

## 2024-08-30 VITALS — HEIGHT: 65 IN | WEIGHT: 154 LBS | BODY MASS INDEX: 25.66 KG/M2

## 2024-08-30 DIAGNOSIS — Z12.31 ENCOUNTER FOR SCREENING MAMMOGRAM FOR MALIGNANT NEOPLASM OF BREAST: ICD-10-CM

## 2024-08-30 PROCEDURE — 77067 SCR MAMMO BI INCL CAD: CPT

## 2024-08-30 PROCEDURE — 77063 BREAST TOMOSYNTHESIS BI: CPT

## 2024-09-06 ENCOUNTER — TELEPHONE (OUTPATIENT)
Age: 74
End: 2024-09-06

## 2024-09-06 ENCOUNTER — TELEPHONE (OUTPATIENT)
Dept: HEMATOLOGY ONCOLOGY | Facility: CLINIC | Age: 74
End: 2024-09-06

## 2024-09-06 DIAGNOSIS — E83.110 HEREDITARY HEMOCHROMATOSIS (HCC): Primary | ICD-10-CM

## 2024-09-06 DIAGNOSIS — D64.9 ANEMIA, UNSPECIFIED TYPE: ICD-10-CM

## 2024-09-06 NOTE — TELEPHONE ENCOUNTER
Pt has an appt on 9/11/24 with Dr. Zazueta and wanted to know if she needs labs to be done. Pt can be reached at [phone].

## 2024-09-09 ENCOUNTER — APPOINTMENT (OUTPATIENT)
Dept: LAB | Facility: CLINIC | Age: 74
End: 2024-09-09
Payer: MEDICARE

## 2024-09-09 DIAGNOSIS — E83.110 HEREDITARY HEMOCHROMATOSIS (HCC): ICD-10-CM

## 2024-09-09 DIAGNOSIS — D64.9 ANEMIA, UNSPECIFIED TYPE: ICD-10-CM

## 2024-09-09 LAB
BASOPHILS # BLD AUTO: 0.06 THOUSANDS/ÂΜL (ref 0–0.1)
BASOPHILS NFR BLD AUTO: 2 % (ref 0–1)
EOSINOPHIL # BLD AUTO: 0.37 THOUSAND/ÂΜL (ref 0–0.61)
EOSINOPHIL NFR BLD AUTO: 9 % (ref 0–6)
ERYTHROCYTE [DISTWIDTH] IN BLOOD BY AUTOMATED COUNT: 12.4 % (ref 11.6–15.1)
FERRITIN SERPL-MCNC: 22 NG/ML (ref 11–307)
HCT VFR BLD AUTO: 37.8 % (ref 34.8–46.1)
HGB BLD-MCNC: 12.4 G/DL (ref 11.5–15.4)
IMM GRANULOCYTES # BLD AUTO: 0.01 THOUSAND/UL (ref 0–0.2)
IMM GRANULOCYTES NFR BLD AUTO: 0 % (ref 0–2)
IRON SATN MFR SERPL: 41 % (ref 15–50)
IRON SERPL-MCNC: 117 UG/DL (ref 50–212)
LYMPHOCYTES # BLD AUTO: 1.19 THOUSANDS/ÂΜL (ref 0.6–4.47)
LYMPHOCYTES NFR BLD AUTO: 29 % (ref 14–44)
MCH RBC QN AUTO: 32.5 PG (ref 26.8–34.3)
MCHC RBC AUTO-ENTMCNC: 32.8 G/DL (ref 31.4–37.4)
MCV RBC AUTO: 99 FL (ref 82–98)
MONOCYTES # BLD AUTO: 0.34 THOUSAND/ÂΜL (ref 0.17–1.22)
MONOCYTES NFR BLD AUTO: 8 % (ref 4–12)
NEUTROPHILS # BLD AUTO: 2.12 THOUSANDS/ÂΜL (ref 1.85–7.62)
NEUTS SEG NFR BLD AUTO: 52 % (ref 43–75)
NRBC BLD AUTO-RTO: 0 /100 WBCS
PLATELET # BLD AUTO: 204 THOUSANDS/UL (ref 149–390)
PMV BLD AUTO: 10.1 FL (ref 8.9–12.7)
RBC # BLD AUTO: 3.82 MILLION/UL (ref 3.81–5.12)
TIBC SERPL-MCNC: 283 UG/DL (ref 250–450)
UIBC SERPL-MCNC: 166 UG/DL (ref 155–355)
WBC # BLD AUTO: 4.09 THOUSAND/UL (ref 4.31–10.16)

## 2024-09-09 PROCEDURE — 36415 COLL VENOUS BLD VENIPUNCTURE: CPT

## 2024-09-09 PROCEDURE — 82728 ASSAY OF FERRITIN: CPT

## 2024-09-09 PROCEDURE — 83540 ASSAY OF IRON: CPT

## 2024-09-09 PROCEDURE — 83550 IRON BINDING TEST: CPT

## 2024-09-09 PROCEDURE — 85025 COMPLETE CBC W/AUTO DIFF WBC: CPT

## 2024-09-11 ENCOUNTER — OFFICE VISIT (OUTPATIENT)
Dept: HEMATOLOGY ONCOLOGY | Facility: CLINIC | Age: 74
End: 2024-09-11
Payer: MEDICARE

## 2024-09-11 VITALS
DIASTOLIC BLOOD PRESSURE: 82 MMHG | HEART RATE: 47 BPM | OXYGEN SATURATION: 98 % | HEIGHT: 65 IN | TEMPERATURE: 97.6 F | RESPIRATION RATE: 17 BRPM | SYSTOLIC BLOOD PRESSURE: 130 MMHG | WEIGHT: 161.4 LBS | BODY MASS INDEX: 26.89 KG/M2

## 2024-09-11 DIAGNOSIS — E83.110 HEREDITARY HEMOCHROMATOSIS (HCC): Primary | ICD-10-CM

## 2024-09-11 PROCEDURE — 99213 OFFICE O/P EST LOW 20 MIN: CPT | Performed by: INTERNAL MEDICINE

## 2024-09-11 PROCEDURE — G2211 COMPLEX E/M VISIT ADD ON: HCPCS | Performed by: INTERNAL MEDICINE

## 2024-09-11 NOTE — PROGRESS NOTES
[facility] [place]  [address]  [facility] [address]  [phone]  [phone]    Tami Rivera,1950, [id number]  24    Discussion:   In summary, this is a 74-year-old female history of hereditary hemochromatosis.  She had some pain in the left leg, improving with stretching exercises.  Recent WBC 4.0, hemoglobin 12.4, platelet count 204.  Ferritin 22, iron saturation 41%.  She continues occasional phlebotomy.    I discussed the above with the patient.  The patient  voiced understanding and agreement.  ______________________________________________________________________    Chief Complaint   Patient presents with    Follow-up       HPI:  Oncology History    No history exists.       Interval History: Clinically stable.  ECOG-  0 - Asymptomatic    Review of Systems   Constitutional:  Negative for appetite change, diaphoresis, fatigue and fever.   HENT:  Negative for sinus pain.    Eyes:  Negative for discharge.   Respiratory:  Negative for cough and shortness of breath.    Cardiovascular:  Negative for chest pain.   Gastrointestinal:  Negative for abdominal pain, constipation and diarrhea.   Endocrine: Negative for cold intolerance.   Genitourinary:  Negative for difficulty urinating and hematuria.   Musculoskeletal:  Negative for joint swelling.   Skin:  Negative for rash.   Allergic/Immunologic: Negative for environmental allergies.   Neurological:  Negative for dizziness and headaches.   Hematological:  Negative for adenopathy.   Psychiatric/Behavioral:  Negative for agitation.        Past Medical History:   Diagnosis Date    Blood clot in vein     right leg    Cardiac disease     right bundle branch block    Clotting disorder (HCC) 2002    Hemachromotosis    Fibroids     Hemochromatosis     Hypertension     Right bundle branch block      (spontaneous vaginal delivery)     x2     Patient Active Problem List   Diagnosis    Hereditary hemochromatosis (HCC)  "   Osteopenia of both hips    Elevated serum creatinine    Anemia, unspecified    Unilateral inguinal hernia without obstruction or gangrene    Abdominal pain    Venous aneurysm    Anemia due to stage 3a chronic kidney disease  (HCC)       Current Outpatient Medications:     B Complex Vitamins (B COMPLEX PO), Take by mouth daily Super energy, Disp: , Rfl:     Biotin 58100 MCG TABS, Take by mouth, Disp: , Rfl:     CALCIUM PO, Take 1,000 mg by mouth daily, Disp: , Rfl:     carvedilol (COREG) 12.5 mg tablet, Take 12.5 mg by mouth 2 (two) times a day with meals, Disp: , Rfl:     Cholecalciferol (VITAMIN D PO), Take 2,000 Units by mouth daily  , Disp: , Rfl:     fexofenadine (ALLEGRA) 60 MG tablet, Take 60 mg by mouth every 12 (twelve) hours as needed, Disp: , Rfl:     Probiotic Product (HEALTHY COLON PO), Take by mouth, Disp: , Rfl:     Propylene Glycol (SYSTANE COMPLETE OP), Apply to eye, Disp: , Rfl:     Ferrous Sulfate (IRON PO), Take by mouth, Disp: , Rfl:     ketotifen (ZADITOR) 0.025 % ophthalmic solution, 1 drop as needed, Disp: , Rfl:   Allergies   Allergen Reactions    Biaxin [Clarithromycin] GI Intolerance and Headache    Claritin [Loratadine] GI Intolerance and Headache    Macrodantin [Nitrofurantoin Macrocrystal] GI Intolerance and Headache    Strawberry C [Ascorbate - Food Allergy] Itching and Throat Swelling     Past Surgical History:   Procedure Laterality Date    APPENDECTOMY      DILATION AND CURETTAGE OF UTERUS      HYSTERECTOMY      age 33 per MRS    AK LIG&DIV LONG SAPH VEIN SAPHFEM JUNCT/INTERRUPJ Left 2/14/2023    Procedure: ligation of left great saphenous vein;  Surgeon: Moise rBar MD;  Location: [facility];  Service: Vascular     Social History     Objective:  Vitals:    09/11/24 1100   BP: 130/82   BP Location: Left arm   Patient Position: Sitting   Cuff Size: Adult   Pulse: (!) 47   Resp: 17   Temp: 97.6 °F (36.4 °C)   SpO2: 98%   Weight: 73.2 kg (161 lb 6.4 oz)   Height: 5' 5\" " (1.651 m)     Physical Exam  Constitutional:       Appearance: She is well-developed.   HENT:      Head: Normocephalic and atraumatic.   Eyes:      Pupils: Pupils are equal, round, and reactive to light.   Cardiovascular:      Rate and Rhythm: Normal rate.      Heart sounds: No murmur heard.  Pulmonary:      Effort: No respiratory distress.      Breath sounds: No wheezing or rales.   Abdominal:      General: There is no distension.      Palpations: Abdomen is soft.      Tenderness: There is no abdominal tenderness. There is no rebound.   Musculoskeletal:         General: No tenderness.      Cervical back: Neck supple.   Lymphadenopathy:      Cervical: No cervical adenopathy.   Skin:     General: Skin is warm.      Findings: No rash.   Neurological:      Mental Status: She is alert and oriented to person, place, and time.      Deep Tendon Reflexes: Reflexes normal.   Psychiatric:         Thought Content: Thought content normal.           Labs:  I personally reviewed the labs and imaging pertinent to this patient care.

## 2024-11-01 ENCOUNTER — TELEPHONE (OUTPATIENT)
Age: 74
End: 2024-11-01

## 2024-11-01 NOTE — TELEPHONE ENCOUNTER
Spoke with patient regarding needing to reschedule appointment with Dr. Zazueta due to location change, patient verbalized understanding.

## 2024-11-19 ENCOUNTER — ANNUAL EXAM (OUTPATIENT)
Age: 74
End: 2024-11-19
Payer: MEDICARE

## 2024-11-19 VITALS
HEIGHT: 65 IN | WEIGHT: 161 LBS | SYSTOLIC BLOOD PRESSURE: 126 MMHG | BODY MASS INDEX: 26.82 KG/M2 | DIASTOLIC BLOOD PRESSURE: 80 MMHG

## 2024-11-19 DIAGNOSIS — Z01.419 ENCOUNTER FOR ANNUAL ROUTINE GYNECOLOGICAL EXAMINATION: Primary | ICD-10-CM

## 2024-11-19 DIAGNOSIS — Z12.31 SCREENING MAMMOGRAM FOR BREAST CANCER: ICD-10-CM

## 2024-11-19 DIAGNOSIS — Z13.820 SCREENING FOR OSTEOPOROSIS: ICD-10-CM

## 2024-11-19 DIAGNOSIS — Z78.0 MENOPAUSE: ICD-10-CM

## 2024-11-19 PROCEDURE — G0101 CA SCREEN;PELVIC/BREAST EXAM: HCPCS | Performed by: OBSTETRICS & GYNECOLOGY

## 2024-11-19 NOTE — PROGRESS NOTES
Tami Rivera   1950    CC:  Yearly exam    S:  74 y.o. female here for yearly exam. She is s/p hysterectomy. She denies vaginal bleeding.     She denies vaginal discharge, itching, odor or dryness.     She has no urinary, bowel, breast concerns. Does have incontinence - this has been manageable for her.     She is sexually active without pain, bleeding or dryness.    Last Pap  - not indicated  Last Mammo - 8/30/24 - BIRad 1  Last Colonoscopy 12/30/20 - 5yr recall   Last DEXA 7/18/23 - osteopenia    Family hx of breast cancer: sister, MGM  Family hx of ovarian cancer: no  Family hx of colon cancer: no      Current Outpatient Medications:     B Complex Vitamins (B COMPLEX PO), Take by mouth daily Super energy, Disp: , Rfl:     Biotin 63268 MCG TABS, Take by mouth, Disp: , Rfl:     CALCIUM PO, Take 1,000 mg by mouth daily, Disp: , Rfl:     carvedilol (COREG) 12.5 mg tablet, Take 12.5 mg by mouth 2 (two) times a day with meals, Disp: , Rfl:     Cholecalciferol (VITAMIN D PO), Take 2,000 Units by mouth daily  , Disp: , Rfl:     fexofenadine (ALLEGRA) 60 MG tablet, Take 60 mg by mouth every 12 (twelve) hours as needed, Disp: , Rfl:     Probiotic Product (HEALTHY COLON PO), Take by mouth, Disp: , Rfl:     Propylene Glycol (SYSTANE COMPLETE OP), Apply to eye, Disp: , Rfl:     Ferrous Sulfate (IRON PO), Take by mouth, Disp: , Rfl:     ketotifen (ZADITOR) 0.025 % ophthalmic solution, 1 drop as needed, Disp: , Rfl:   Patient Active Problem List   Diagnosis    Hereditary hemochromatosis (HCC)    Osteopenia of both hips    Elevated serum creatinine    Anemia, unspecified    Unilateral inguinal hernia without obstruction or gangrene    Abdominal pain    Venous aneurysm    Anemia due to stage 3a chronic kidney disease  (HCC)     Past Medical History:   Diagnosis Date    Blood clot in vein     right leg    Cardiac disease     right bundle branch block    Clotting disorder (HCC) 2002    Hemachromotosis    Fibroids      "Hemochromatosis     Hypertension     Right bundle branch block      (spontaneous vaginal delivery)     x2     Past Surgical History:   Procedure Laterality Date    APPENDECTOMY      DILATION AND CURETTAGE OF UTERUS      HYSTERECTOMY      age 33 per MRS    DC LIG&DIV LONG SAPH VEIN SAPHFEM JUNCT/INTERRUPJ Left 2023    Procedure: ligation of left great saphenous vein;  Surgeon: Moise Brar MD;  Location: [facility];  Service: Vascular       Review of Systems   Respiratory: Negative.    Cardiovascular: Negative.    Gastrointestinal: Negative for constipation and diarrhea.   Genitourinary: Negative for difficulty urinating, pelvic pain, vaginal bleeding, vaginal discharge, itching, or odor.    O:  Blood pressure 126/80, height 5' 5\" (1.651 m), weight 73 kg (161 lb).    Patient appears well and is not in distress  Breasts are symmetrical without mass, tenderness, nipple discharge, skin changes or adenopathy.   Abdomen is soft and nontender without masses.    exam deferred.      A:   Yearly exam.     P:   Mammo slip provided   Colonoscopy up to date   DEXA ordered    RTO one year for yearly exam or sooner as needed.      "

## 2025-06-27 ENCOUNTER — TELEPHONE (OUTPATIENT)
Age: 75
End: 2025-06-27

## 2025-06-27 NOTE — LETTER
Tami Rivera  [address] [facility] [address]        Colonoscopy Preparation: DULCOLAX & MIRALAX    [facility] - [address] [place] [id number] - Ginny Hernandez - Entrance A - 1st FL    Performing Physician: Dr. Vince Alvarez    DATE OF PROCEDURE: January 2, 2026    The OR/GI Lab will contact you the evening prior to your procedure with your exact arrival time.    We kindly ask that you immediately notify us of any need to cancel or reschedule your procedure.  _________________________________________________________________    WEEK BEFORE YOUR PROCEDURE:  Do not take Iron tablets for one week.  5 days prior to the appointment AVOID vegetables and fruits with skins or seeds, nuts, corn, popcorn, and whole grain breads.  Purchase: One (1) 238-gram container of Miralax (polyethylene glycol 3350), four (4) 5 mg Dulcolax (bisacodyl) tablets, and 64-ounces of Gatorade (sports drink) - no red, orange, or purple. These may be purchased at any pharmacy without a prescription. Generic products are permissible.  Arrange responsible transportation for day of the procedure.  DAY BEFORE THE PROCEDURE:  Clear liquids only for entire day prior. Nothing red, orange or purple.  You MAY have:  Soda  Water  Broth Gatorade  Jell-O  Popsicles Coffee/Tea without milk/creamer   YOU MAY NOT HAVE:  Solid Foods  Milk and milk products  Juice with pulp  BOWEL PREPARATION: Includes: One (1) 238-gram container of Miralax (polyethylene glycol 3350), four (4) 5 mg Dulcolax (bisacodyl) tablets, and 64-ounces of Gatorade (sports drink). Preparation may be refrigerated. Entire bowel prep should be completed.            Colonoscopy Preparation: DULCOLAX & MIRALAX    Afternoon before the procedure (2:00 pm - 5:00 pm):  Take two (2) 5 mg Dulcolax laxative tablets.    Evening before procedure (6:00 pm):  Mix entire container of Miralax with 64-ounces of Gatorade and shake until all medication is dissolved.  Begin drinking  solution. Drink an eight (8) ounce cup every 10-15 minutes until you have consumed half (32 ounces) of the solution. Refrigerate remaining solution.    Night before the procedure (8:00 pm):  Take two (2) 5 mg Dulcolax laxative tablets.    Beginning 5 hours before your procedure:  Drink the remaining amount of prepared solution (32 ounces). Drink an eight (8) ounce cup every 10-15 minutes until you have consumed the remaining solution.    Bowel prep should be completed 4 hours prior to your procedure time.    NOTHING TO EAT OR DRINK AFTER MIDNIGHT - EXCEPT YOUR BOWEL PREP    DAY OF PROCEDURE:  You may brush your teeth.  Leave all jewelry at home. Take out any facial piercings, if able.  Please arrive for your procedure as indicated by the OR / GI Lab / Endoscopy Unit. The hospital will contact you the day before with your exact arrival time.  Make sure you have arranged ahead of time for a responsible adult (18 or older) to accompany and drive you home after the procedure. Please discuss any transportation concerns with our staff prior to your procedure.  The effects of the anesthesia can persist for 24 hours. After receiving the sedation, you must exercise caution before engaging in any activity that could harm yourself and others (such as driving a car). Do not make any important decisions or do not drink any alcoholic beverages during this time period.        Colonoscopy Preparation: DULCOLAX & MIRALAX    After your procedure, you may have anything you'd like to eat or drink. You will probably want to start with something light. Please include plenty of fluids. Avoid items that cause gas such as sodas and salads.  SPECIAL INSTRUCTIONS:    For patients currently taking blood thinners and/or antiplatelet therapy our office will contact the prescribing provider. Our office will contact you with any required changes to your medication regimen.    Blood thinner (i.e. - Coumadin, Pradaxa, Lovenox, Xarelto,  Eliquis)  Continue (Do Not Stop)  Stop_______________for_______________days prior to the procedure.  Antiplatelet (i.e. - Plavix, Aggrenox, Effient, Brilinta)  Continue (Do Not Stop)  Stop_______________for_______________days prior to the procedure.  Diabetic/Weight loss medication (i.e.- Insulin, GLP1 agonist)  Medication:_______________Hold:_______________days prior to the procedure      NOTHING TO EAT OR DRINK (INCLUDING CHEWING GUM) AFTER 12 MIDNIGHT PRIOR TO YOUR PROCEDURE EXCEPT YOUR BOWEL PREP.    For any questions or concerns related to your bowel preparation or pre-procedure instructions, please contact our office.  Thank you for choosing [facility] or [facility]!                      [phone] Gastroenterology  [phone] Colon & Rectal Surgery

## 2025-06-27 NOTE — TELEPHONE ENCOUNTER
1/2/2026 KAYE FABIOLA DE instructions given to patient    12/30/20 POORNIMA DE polyp 5  yr recall    Pt was scheduled for an office visit on 6/27/25, per Dr. Alvarez no need for OV.  Pt was trying to schedule for a recall colonoscopy.

## 2025-07-22 ENCOUNTER — HOSPITAL ENCOUNTER (OUTPATIENT)
Dept: BONE DENSITY | Facility: IMAGING CENTER | Age: 75
Discharge: HOME | End: 2025-07-22
Attending: OBSTETRICS & GYNECOLOGY
Payer: MEDICARE

## 2025-07-22 VITALS — WEIGHT: 157.8 LBS | BODY MASS INDEX: 26.29 KG/M2 | HEIGHT: 65 IN

## 2025-07-22 DIAGNOSIS — Z13.820 SCREENING FOR OSTEOPOROSIS: ICD-10-CM

## 2025-07-22 DIAGNOSIS — Z78.0 MENOPAUSE: ICD-10-CM

## 2025-07-22 PROCEDURE — 77080 DXA BONE DENSITY AXIAL: CPT

## 2025-07-31 ENCOUNTER — RESULTS FOLLOW-UP (OUTPATIENT)
Dept: LABOR AND DELIVERY | Facility: HOSPITAL | Age: 75
End: 2025-07-31

## (undated) DEVICE — GLOVE SRG BIOGEL 7

## (undated) DEVICE — BETHLEHEM UNIVERSAL MINOR GEN: Brand: CARDINAL HEALTH

## (undated) DEVICE — SUT MONOCRYL 4-0 PS-2 18 IN Y496G

## (undated) DEVICE — INTENDED FOR TISSUE SEPARATION, AND OTHER PROCEDURES THAT REQUIRE A SHARP SURGICAL BLADE TO PUNCTURE OR CUT.: Brand: BARD-PARKER SAFETY BLADES SIZE 15, STERILE

## (undated) DEVICE — DRAPE SHEET X-LG

## (undated) DEVICE — TIBURON SPLIT SHEET: Brand: CONVERTORS

## (undated) DEVICE — SUT SILK 3-0 18 IN A184H

## (undated) DEVICE — ACE WRAP 4 IN STERILE

## (undated) DEVICE — PENCIL ELECTROSURG E-Z CLEAN -0035H

## (undated) DEVICE — COBAN 4 IN STERILE

## (undated) DEVICE — KERLIX BANDAGE ROLL: Brand: KERLIX

## (undated) DEVICE — ACE WRAP 6 IN STERILE

## (undated) DEVICE — CHLORAPREP HI-LITE 26ML ORANGE

## (undated) DEVICE — PAD GROUNDING ADULT

## (undated) DEVICE — DRAPE SHEET THREE QUARTER

## (undated) DEVICE — 3M™ STERI-STRIP™ REINFORCED ADHESIVE SKIN CLOSURES, R1547, 1/2 IN X 4 IN (12 MM X 100 MM), 6 STRIPS/ENVELOPE: Brand: 3M™ STERI-STRIP™

## (undated) DEVICE — ADHESIVE SKIN HIGH VISCOSITY EXOFIN 1ML